# Patient Record
Sex: FEMALE | Race: ASIAN | NOT HISPANIC OR LATINO | Employment: FULL TIME | URBAN - METROPOLITAN AREA
[De-identification: names, ages, dates, MRNs, and addresses within clinical notes are randomized per-mention and may not be internally consistent; named-entity substitution may affect disease eponyms.]

---

## 2021-05-30 ENCOUNTER — RECORDS - HEALTHEAST (OUTPATIENT)
Dept: ADMINISTRATIVE | Facility: CLINIC | Age: 41
End: 2021-05-30

## 2021-10-12 ENCOUNTER — APPOINTMENT (OUTPATIENT)
Dept: CT IMAGING | Facility: CLINIC | Age: 41
DRG: 291 | End: 2021-10-12
Attending: EMERGENCY MEDICINE
Payer: COMMERCIAL

## 2021-10-12 ENCOUNTER — HOSPITAL ENCOUNTER (INPATIENT)
Facility: CLINIC | Age: 41
LOS: 1 days | Discharge: OTHER INSTITUTION | DRG: 291 | End: 2021-10-13
Attending: EMERGENCY MEDICINE | Admitting: INTERNAL MEDICINE
Payer: COMMERCIAL

## 2021-10-12 DIAGNOSIS — J81.0 ACUTE PULMONARY EDEMA (H): ICD-10-CM

## 2021-10-12 DIAGNOSIS — I16.0 HYPERTENSIVE URGENCY: ICD-10-CM

## 2021-10-12 DIAGNOSIS — J90 PLEURAL EFFUSION, BILATERAL: ICD-10-CM

## 2021-10-12 DIAGNOSIS — R79.89 ELEVATED LIVER FUNCTION TESTS: ICD-10-CM

## 2021-10-12 DIAGNOSIS — I31.39 PERICARDIAL EFFUSION: ICD-10-CM

## 2021-10-12 LAB
BNP SERPL-MCNC: 1421 PG/ML (ref 0–64)
C REACTIVE PROTEIN LHE: 1.3 MG/DL (ref 0–0.8)
INR PPP: 1.29 (ref 0.85–1.15)
TROPONIN I SERPL-MCNC: 0.04 NG/ML (ref 0–0.29)
TSH SERPL DL<=0.005 MIU/L-ACNC: 1.21 UIU/ML (ref 0.3–5)

## 2021-10-12 PROCEDURE — 86141 C-REACTIVE PROTEIN HS: CPT | Performed by: EMERGENCY MEDICINE

## 2021-10-12 PROCEDURE — 85610 PROTHROMBIN TIME: CPT | Performed by: EMERGENCY MEDICINE

## 2021-10-12 PROCEDURE — 36415 COLL VENOUS BLD VENIPUNCTURE: CPT | Performed by: EMERGENCY MEDICINE

## 2021-10-12 PROCEDURE — 84484 ASSAY OF TROPONIN QUANT: CPT | Performed by: EMERGENCY MEDICINE

## 2021-10-12 PROCEDURE — 93005 ELECTROCARDIOGRAM TRACING: CPT | Performed by: EMERGENCY MEDICINE

## 2021-10-12 PROCEDURE — 99285 EMERGENCY DEPT VISIT HI MDM: CPT | Mod: 25

## 2021-10-12 PROCEDURE — 74176 CT ABD & PELVIS W/O CONTRAST: CPT

## 2021-10-12 PROCEDURE — 83880 ASSAY OF NATRIURETIC PEPTIDE: CPT | Performed by: EMERGENCY MEDICINE

## 2021-10-12 PROCEDURE — 250N000013 HC RX MED GY IP 250 OP 250 PS 637: Performed by: EMERGENCY MEDICINE

## 2021-10-12 PROCEDURE — 84443 ASSAY THYROID STIM HORMONE: CPT | Performed by: EMERGENCY MEDICINE

## 2021-10-12 RX ORDER — NITROGLYCERIN 20 MG/100ML
10-200 INJECTION INTRAVENOUS CONTINUOUS
Status: DISCONTINUED | OUTPATIENT
Start: 2021-10-13 | End: 2021-10-13

## 2021-10-12 RX ORDER — NITROGLYCERIN 0.4 MG/1
0.4 TABLET SUBLINGUAL EVERY 5 MIN PRN
Status: DISCONTINUED | OUTPATIENT
Start: 2021-10-12 | End: 2021-10-12

## 2021-10-12 RX ADMIN — NITROGLYCERIN 15 MG: 20 OINTMENT TOPICAL at 23:04

## 2021-10-12 ASSESSMENT — MIFFLIN-ST. JEOR: SCORE: 1093.2

## 2021-10-13 ENCOUNTER — HOSPITAL ENCOUNTER (INPATIENT)
Facility: HOSPITAL | Age: 41
LOS: 3 days | Discharge: HOME | DRG: 291 | End: 2021-10-16
Attending: INTERNAL MEDICINE | Admitting: INTERNAL MEDICINE
Payer: COMMERCIAL

## 2021-10-13 ENCOUNTER — APPOINTMENT (OUTPATIENT)
Dept: CARDIOLOGY | Facility: CLINIC | Age: 41
DRG: 291 | End: 2021-10-13
Attending: INTERNAL MEDICINE
Payer: COMMERCIAL

## 2021-10-13 VITALS
RESPIRATION RATE: 20 BRPM | SYSTOLIC BLOOD PRESSURE: 136 MMHG | HEART RATE: 70 BPM | OXYGEN SATURATION: 93 % | BODY MASS INDEX: 28.06 KG/M2 | WEIGHT: 130.07 LBS | DIASTOLIC BLOOD PRESSURE: 82 MMHG | HEIGHT: 57 IN | TEMPERATURE: 97.8 F

## 2021-10-13 DIAGNOSIS — F90.9 ATTENTION DEFICIT HYPERACTIVITY DISORDER (ADHD), UNSPECIFIED ADHD TYPE: ICD-10-CM

## 2021-10-13 DIAGNOSIS — J90 PLEURAL EFFUSION, BILATERAL: ICD-10-CM

## 2021-10-13 DIAGNOSIS — I31.39 PERICARDIAL EFFUSION: ICD-10-CM

## 2021-10-13 DIAGNOSIS — I50.21 ACUTE SYSTOLIC CONGESTIVE HEART FAILURE (H): Primary | ICD-10-CM

## 2021-10-13 PROBLEM — J81.0 ACUTE PULMONARY EDEMA (H): Status: ACTIVE | Noted: 2021-10-13

## 2021-10-13 PROBLEM — I16.0 HYPERTENSIVE URGENCY: Status: ACTIVE | Noted: 2021-10-13

## 2021-10-13 PROBLEM — F10.10 ALCOHOL CONSUMPTION BINGE DRINKING: Chronic | Status: ACTIVE | Noted: 2021-10-13

## 2021-10-13 PROBLEM — R79.89 ELEVATED LIVER FUNCTION TESTS: Status: ACTIVE | Noted: 2021-10-13

## 2021-10-13 PROBLEM — Z78.9 ELECTRONIC CIGARETTE USE: Chronic | Status: ACTIVE | Noted: 2021-10-13

## 2021-10-13 LAB
ATRIAL RATE - MUSE: 105 BPM
DIASTOLIC BLOOD PRESSURE - MUSE: 123 MMHG
FERRITIN SERPL-MCNC: 76 NG/ML (ref 10–130)
GLUCOSE BLDC GLUCOMTR-MCNC: 124 MG/DL (ref 70–99)
INTERPRETATION ECG - MUSE: NORMAL
LVEF ECHO: NORMAL
P AXIS - MUSE: 64 DEGREES
PR INTERVAL - MUSE: 182 MS
QRS DURATION - MUSE: 98 MS
QT - MUSE: 302 MS
QTC - MUSE: 399 MS
R AXIS - MUSE: 91 DEGREES
SYSTOLIC BLOOD PRESSURE - MUSE: 167 MMHG
T AXIS - MUSE: 62 DEGREES
VENTRICULAR RATE- MUSE: 105 BPM

## 2021-10-13 PROCEDURE — 250N000013 HC RX MED GY IP 250 OP 250 PS 637: Performed by: FAMILY MEDICINE

## 2021-10-13 PROCEDURE — 93306 TTE W/DOPPLER COMPLETE: CPT | Mod: 26 | Performed by: INTERNAL MEDICINE

## 2021-10-13 PROCEDURE — 99207 PR NON-BILLABLE SERV PER CHARTING: CPT | Performed by: INTERNAL MEDICINE

## 2021-10-13 PROCEDURE — 255N000002 HC RX 255 OP 636: Performed by: INTERNAL MEDICINE

## 2021-10-13 PROCEDURE — 210N000001 HC R&B IMCU HEART CARE

## 2021-10-13 PROCEDURE — 250N000013 HC RX MED GY IP 250 OP 250 PS 637: Performed by: INTERNAL MEDICINE

## 2021-10-13 PROCEDURE — 99223 1ST HOSP IP/OBS HIGH 75: CPT | Performed by: INTERNAL MEDICINE

## 2021-10-13 PROCEDURE — 99207 PR NO BILLABLE SERVICE THIS VISIT: CPT | Performed by: FAMILY MEDICINE

## 2021-10-13 PROCEDURE — 96365 THER/PROPH/DIAG IV INF INIT: CPT

## 2021-10-13 PROCEDURE — 96366 THER/PROPH/DIAG IV INF ADDON: CPT

## 2021-10-13 PROCEDURE — 250N000009 HC RX 250: Performed by: EMERGENCY MEDICINE

## 2021-10-13 PROCEDURE — 96375 TX/PRO/DX INJ NEW DRUG ADDON: CPT

## 2021-10-13 PROCEDURE — 82728 ASSAY OF FERRITIN: CPT | Performed by: STUDENT IN AN ORGANIZED HEALTH CARE EDUCATION/TRAINING PROGRAM

## 2021-10-13 PROCEDURE — 36415 COLL VENOUS BLD VENIPUNCTURE: CPT | Performed by: STUDENT IN AN ORGANIZED HEALTH CARE EDUCATION/TRAINING PROGRAM

## 2021-10-13 PROCEDURE — 250N000011 HC RX IP 250 OP 636: Performed by: INTERNAL MEDICINE

## 2021-10-13 PROCEDURE — 120N000001 HC R&B MED SURG/OB

## 2021-10-13 PROCEDURE — 250N000011 HC RX IP 250 OP 636: Performed by: EMERGENCY MEDICINE

## 2021-10-13 PROCEDURE — 250N000011 HC RX IP 250 OP 636: Performed by: STUDENT IN AN ORGANIZED HEALTH CARE EDUCATION/TRAINING PROGRAM

## 2021-10-13 RX ORDER — DEXTROAMPHETAMINE SACCHARATE, AMPHETAMINE ASPARTATE MONOHYDRATE, DEXTROAMPHETAMINE SULFATE AND AMPHETAMINE SULFATE 3.75; 3.75; 3.75; 3.75 MG/1; MG/1; MG/1; MG/1
30 CAPSULE, EXTENDED RELEASE ORAL DAILY
Status: DISCONTINUED | OUTPATIENT
Start: 2021-10-13 | End: 2021-10-14 | Stop reason: ALTCHOICE

## 2021-10-13 RX ORDER — LANOLIN ALCOHOL/MO/W.PET/CERES
3 CREAM (GRAM) TOPICAL
Status: DISCONTINUED | OUTPATIENT
Start: 2021-10-13 | End: 2021-10-13 | Stop reason: HOSPADM

## 2021-10-13 RX ORDER — DEXTROAMPHETAMINE SACCHARATE, AMPHETAMINE ASPARTATE MONOHYDRATE, DEXTROAMPHETAMINE SULFATE AND AMPHETAMINE SULFATE 1.25; 1.25; 1.25; 1.25 MG/1; MG/1; MG/1; MG/1
40 CAPSULE, EXTENDED RELEASE ORAL DAILY
Status: DISCONTINUED | OUTPATIENT
Start: 2021-10-14 | End: 2021-10-13

## 2021-10-13 RX ORDER — SPIRONOLACTONE 25 MG/1
25 TABLET ORAL DAILY
Status: CANCELLED | OUTPATIENT
Start: 2021-10-14

## 2021-10-13 RX ORDER — LANOLIN ALCOHOL/MO/W.PET/CERES
3 CREAM (GRAM) TOPICAL
Status: CANCELLED | OUTPATIENT
Start: 2021-10-13

## 2021-10-13 RX ORDER — CARVEDILOL 12.5 MG/1
12.5 TABLET ORAL 2 TIMES DAILY WITH MEALS
Status: DISCONTINUED | OUTPATIENT
Start: 2021-10-13 | End: 2021-10-13

## 2021-10-13 RX ORDER — ALBUTEROL SULFATE 90 UG/1
1-2 AEROSOL, METERED RESPIRATORY (INHALATION) EVERY 4 HOURS PRN
Status: DISCONTINUED | OUTPATIENT
Start: 2021-10-13 | End: 2021-10-16 | Stop reason: HOSPADM

## 2021-10-13 RX ORDER — DEXTROAMPHETAMINE SACCHARATE, AMPHETAMINE ASPARTATE MONOHYDRATE, DEXTROAMPHETAMINE SULFATE AND AMPHETAMINE SULFATE 1.25; 1.25; 1.25; 1.25 MG/1; MG/1; MG/1; MG/1
10 CAPSULE, EXTENDED RELEASE ORAL DAILY
Status: DISCONTINUED | OUTPATIENT
Start: 2021-10-13 | End: 2021-10-14 | Stop reason: ALTCHOICE

## 2021-10-13 RX ORDER — MONTELUKAST SODIUM 10 MG/1
10 TABLET ORAL DAILY
Status: CANCELLED | OUTPATIENT
Start: 2021-10-13

## 2021-10-13 RX ORDER — ACETAMINOPHEN 650 MG/1
650 SUPPOSITORY RECTAL EVERY 6 HOURS PRN
Status: DISCONTINUED | OUTPATIENT
Start: 2021-10-13 | End: 2021-10-13 | Stop reason: HOSPADM

## 2021-10-13 RX ORDER — IBUPROFEN 400 MG/1
400 TABLET, FILM COATED ORAL EVERY 6 HOURS PRN
Status: ON HOLD | COMMUNITY
End: 2021-10-16

## 2021-10-13 RX ORDER — LIDOCAINE 40 MG/G
CREAM TOPICAL
Status: CANCELLED | OUTPATIENT
Start: 2021-10-13

## 2021-10-13 RX ORDER — ALBUTEROL SULFATE 90 UG/1
1-2 AEROSOL, METERED RESPIRATORY (INHALATION) EVERY 4 HOURS PRN
COMMUNITY
Start: 2021-08-02

## 2021-10-13 RX ORDER — DEXTROAMPHETAMINE SACCHARATE, AMPHETAMINE ASPARTATE MONOHYDRATE, DEXTROAMPHETAMINE SULFATE AND AMPHETAMINE SULFATE 1.25; 1.25; 1.25; 1.25 MG/1; MG/1; MG/1; MG/1
40 CAPSULE, EXTENDED RELEASE ORAL DAILY
Status: CANCELLED | OUTPATIENT
Start: 2021-10-14

## 2021-10-13 RX ORDER — ALBUTEROL SULFATE 90 UG/1
1-2 AEROSOL, METERED RESPIRATORY (INHALATION) EVERY 4 HOURS PRN
Status: CANCELLED | OUTPATIENT
Start: 2021-10-13

## 2021-10-13 RX ORDER — LIDOCAINE 40 MG/G
CREAM TOPICAL
Status: DISCONTINUED | OUTPATIENT
Start: 2021-10-13 | End: 2021-10-16 | Stop reason: HOSPADM

## 2021-10-13 RX ORDER — TRIAMCINOLONE ACETONIDE 1 MG/G
1 CREAM TOPICAL 2 TIMES DAILY PRN
COMMUNITY
Start: 2021-06-21

## 2021-10-13 RX ORDER — ACETAMINOPHEN 325 MG/1
650 TABLET ORAL EVERY 6 HOURS PRN
Status: DISCONTINUED | OUTPATIENT
Start: 2021-10-13 | End: 2021-10-13 | Stop reason: HOSPADM

## 2021-10-13 RX ORDER — ONDANSETRON 2 MG/ML
4 INJECTION INTRAMUSCULAR; INTRAVENOUS EVERY 6 HOURS PRN
Status: CANCELLED | OUTPATIENT
Start: 2021-10-13

## 2021-10-13 RX ORDER — METOPROLOL TARTRATE 1 MG/ML
2.5 INJECTION, SOLUTION INTRAVENOUS ONCE
Status: COMPLETED | OUTPATIENT
Start: 2021-10-13 | End: 2021-10-13

## 2021-10-13 RX ORDER — FLUTICASONE PROPIONATE 220 UG/1
2 AEROSOL, METERED RESPIRATORY (INHALATION) 2 TIMES DAILY
Status: CANCELLED | OUTPATIENT
Start: 2021-10-13

## 2021-10-13 RX ORDER — ACETAMINOPHEN 650 MG/1
650 SUPPOSITORY RECTAL EVERY 6 HOURS PRN
Status: CANCELLED | OUTPATIENT
Start: 2021-10-13

## 2021-10-13 RX ORDER — METOPROLOL TARTRATE 25 MG/1
50 TABLET, FILM COATED ORAL ONCE
Status: COMPLETED | OUTPATIENT
Start: 2021-10-13 | End: 2021-10-13

## 2021-10-13 RX ORDER — ONDANSETRON 4 MG/1
4 TABLET, ORALLY DISINTEGRATING ORAL EVERY 6 HOURS PRN
Status: DISCONTINUED | OUTPATIENT
Start: 2021-10-13 | End: 2021-10-13 | Stop reason: HOSPADM

## 2021-10-13 RX ORDER — MONTELUKAST SODIUM 10 MG/1
10 TABLET ORAL DAILY
Status: DISCONTINUED | OUTPATIENT
Start: 2021-10-13 | End: 2021-10-13 | Stop reason: HOSPADM

## 2021-10-13 RX ORDER — LISINOPRIL 5 MG/1
10 TABLET ORAL DAILY
Status: DISCONTINUED | OUTPATIENT
Start: 2021-10-14 | End: 2021-10-16 | Stop reason: HOSPADM

## 2021-10-13 RX ORDER — DEXTROAMPHETAMINE SACCHARATE, AMPHETAMINE ASPARTATE MONOHYDRATE, DEXTROAMPHETAMINE SULFATE AND AMPHETAMINE SULFATE 1.25; 1.25; 1.25; 1.25 MG/1; MG/1; MG/1; MG/1
40 CAPSULE, EXTENDED RELEASE ORAL DAILY
Status: DISCONTINUED | OUTPATIENT
Start: 2021-10-13 | End: 2021-10-13 | Stop reason: HOSPADM

## 2021-10-13 RX ORDER — ACETAMINOPHEN 650 MG/1
650 SUPPOSITORY RECTAL EVERY 6 HOURS PRN
Status: DISCONTINUED | OUTPATIENT
Start: 2021-10-13 | End: 2021-10-16 | Stop reason: HOSPADM

## 2021-10-13 RX ORDER — MONTELUKAST SODIUM 10 MG/1
10 TABLET ORAL DAILY
COMMUNITY
Start: 2021-10-11

## 2021-10-13 RX ORDER — FUROSEMIDE 40 MG
40 TABLET ORAL DAILY
Status: DISCONTINUED | OUTPATIENT
Start: 2021-10-13 | End: 2021-10-13

## 2021-10-13 RX ORDER — FUROSEMIDE 10 MG/ML
20 INJECTION INTRAMUSCULAR; INTRAVENOUS EVERY 12 HOURS
Status: DISCONTINUED | OUTPATIENT
Start: 2021-10-13 | End: 2021-10-13 | Stop reason: HOSPADM

## 2021-10-13 RX ORDER — DEXTROAMPHETAMINE SACCHARATE, AMPHETAMINE ASPARTATE MONOHYDRATE, DEXTROAMPHETAMINE SULFATE AND AMPHETAMINE SULFATE 5; 5; 5; 5 MG/1; MG/1; MG/1; MG/1
40 CAPSULE, EXTENDED RELEASE ORAL DAILY
Status: ON HOLD | COMMUNITY
Start: 2021-09-09 | End: 2021-10-16

## 2021-10-13 RX ORDER — FUROSEMIDE 10 MG/ML
20 INJECTION INTRAMUSCULAR; INTRAVENOUS EVERY 12 HOURS
Status: DISCONTINUED | OUTPATIENT
Start: 2021-10-14 | End: 2021-10-13

## 2021-10-13 RX ORDER — NORTRIPTYLINE HYDROCHLORIDE 50 MG/1
60 CAPSULE ORAL DAILY
COMMUNITY
Start: 2021-10-09

## 2021-10-13 RX ORDER — SPIRONOLACTONE 25 MG/1
25 TABLET ORAL DAILY
Status: DISCONTINUED | OUTPATIENT
Start: 2021-10-13 | End: 2021-10-13 | Stop reason: HOSPADM

## 2021-10-13 RX ORDER — ONDANSETRON 2 MG/ML
4 INJECTION INTRAMUSCULAR; INTRAVENOUS EVERY 6 HOURS PRN
Status: DISCONTINUED | OUTPATIENT
Start: 2021-10-13 | End: 2021-10-13 | Stop reason: HOSPADM

## 2021-10-13 RX ORDER — FUROSEMIDE 10 MG/ML
40 INJECTION INTRAMUSCULAR; INTRAVENOUS ONCE
Status: COMPLETED | OUTPATIENT
Start: 2021-10-13 | End: 2021-10-13

## 2021-10-13 RX ORDER — ALBUTEROL SULFATE 90 UG/1
1-2 AEROSOL, METERED RESPIRATORY (INHALATION) EVERY 4 HOURS PRN
Status: DISCONTINUED | OUTPATIENT
Start: 2021-10-13 | End: 2021-10-13 | Stop reason: HOSPADM

## 2021-10-13 RX ORDER — LIDOCAINE 40 MG/G
CREAM TOPICAL
Status: DISCONTINUED | OUTPATIENT
Start: 2021-10-13 | End: 2021-10-13 | Stop reason: HOSPADM

## 2021-10-13 RX ORDER — FUROSEMIDE 10 MG/ML
20 INJECTION INTRAMUSCULAR; INTRAVENOUS EVERY 12 HOURS
Status: CANCELLED | OUTPATIENT
Start: 2021-10-14

## 2021-10-13 RX ORDER — FAMOTIDINE 20 MG/1
20 TABLET, FILM COATED ORAL 2 TIMES DAILY
Status: DISCONTINUED | OUTPATIENT
Start: 2021-10-13 | End: 2021-10-16 | Stop reason: HOSPADM

## 2021-10-13 RX ORDER — FUROSEMIDE 10 MG/ML
40 INJECTION INTRAMUSCULAR; INTRAVENOUS EVERY 8 HOURS
Status: DISCONTINUED | OUTPATIENT
Start: 2021-10-13 | End: 2021-10-14

## 2021-10-13 RX ORDER — ACETAMINOPHEN 325 MG/1
650 TABLET ORAL EVERY 6 HOURS PRN
Status: CANCELLED | OUTPATIENT
Start: 2021-10-13

## 2021-10-13 RX ORDER — ONDANSETRON 2 MG/ML
4 INJECTION INTRAMUSCULAR; INTRAVENOUS EVERY 6 HOURS PRN
Status: DISCONTINUED | OUTPATIENT
Start: 2021-10-13 | End: 2021-10-16 | Stop reason: HOSPADM

## 2021-10-13 RX ORDER — MONTELUKAST SODIUM 10 MG/1
10 TABLET ORAL DAILY
Status: DISCONTINUED | OUTPATIENT
Start: 2021-10-13 | End: 2021-10-16 | Stop reason: HOSPADM

## 2021-10-13 RX ORDER — FAMOTIDINE 20 MG/1
20 TABLET, FILM COATED ORAL 2 TIMES DAILY
Status: CANCELLED | OUTPATIENT
Start: 2021-10-13

## 2021-10-13 RX ORDER — ACETAMINOPHEN 325 MG/1
650 TABLET ORAL EVERY 6 HOURS PRN
Status: DISCONTINUED | OUTPATIENT
Start: 2021-10-13 | End: 2021-10-16 | Stop reason: HOSPADM

## 2021-10-13 RX ORDER — FLUTICASONE PROPIONATE 220 UG/1
2 AEROSOL, METERED RESPIRATORY (INHALATION) 2 TIMES DAILY
COMMUNITY
Start: 2021-10-12

## 2021-10-13 RX ORDER — FAMOTIDINE 20 MG/1
20 TABLET, FILM COATED ORAL 2 TIMES DAILY
Status: DISCONTINUED | OUTPATIENT
Start: 2021-10-13 | End: 2021-10-13 | Stop reason: HOSPADM

## 2021-10-13 RX ORDER — FLUTICASONE PROPIONATE 220 UG/1
2 AEROSOL, METERED RESPIRATORY (INHALATION) 2 TIMES DAILY
Status: DISCONTINUED | OUTPATIENT
Start: 2021-10-13 | End: 2021-10-13 | Stop reason: HOSPADM

## 2021-10-13 RX ORDER — LISINOPRIL 10 MG/1
10 TABLET ORAL DAILY
Status: DISCONTINUED | OUTPATIENT
Start: 2021-10-13 | End: 2021-10-13 | Stop reason: HOSPADM

## 2021-10-13 RX ORDER — ONDANSETRON 4 MG/1
4 TABLET, ORALLY DISINTEGRATING ORAL EVERY 6 HOURS PRN
Status: DISCONTINUED | OUTPATIENT
Start: 2021-10-13 | End: 2021-10-16 | Stop reason: HOSPADM

## 2021-10-13 RX ORDER — FLUTICASONE PROPIONATE 220 UG/1
2 AEROSOL, METERED RESPIRATORY (INHALATION) 2 TIMES DAILY
Status: DISCONTINUED | OUTPATIENT
Start: 2021-10-13 | End: 2021-10-16 | Stop reason: HOSPADM

## 2021-10-13 RX ORDER — LANOLIN ALCOHOL/MO/W.PET/CERES
3 CREAM (GRAM) TOPICAL
Status: DISCONTINUED | OUTPATIENT
Start: 2021-10-13 | End: 2021-10-15

## 2021-10-13 RX ORDER — LISINOPRIL 10 MG/1
10 TABLET ORAL DAILY
Status: CANCELLED | OUTPATIENT
Start: 2021-10-14

## 2021-10-13 RX ORDER — SPIRONOLACTONE 25 MG/1
25 TABLET ORAL DAILY
Status: DISCONTINUED | OUTPATIENT
Start: 2021-10-14 | End: 2021-10-16 | Stop reason: HOSPADM

## 2021-10-13 RX ORDER — ONDANSETRON 4 MG/1
4 TABLET, ORALLY DISINTEGRATING ORAL EVERY 6 HOURS PRN
Status: CANCELLED | OUTPATIENT
Start: 2021-10-13

## 2021-10-13 RX ADMIN — FUROSEMIDE 40 MG: 40 TABLET ORAL at 08:31

## 2021-10-13 RX ADMIN — METOPROLOL TARTRATE 50 MG: 25 TABLET, FILM COATED ORAL at 02:19

## 2021-10-13 RX ADMIN — FUROSEMIDE 20 MG: 10 INJECTION, SOLUTION INTRAMUSCULAR; INTRAVENOUS at 12:21

## 2021-10-13 RX ADMIN — SERTRALINE HYDROCHLORIDE 50 MG: 50 TABLET, FILM COATED ORAL at 11:04

## 2021-10-13 RX ADMIN — FUROSEMIDE 40 MG: 10 INJECTION, SOLUTION INTRAMUSCULAR; INTRAVENOUS at 22:48

## 2021-10-13 RX ADMIN — PERFLUTREN 2.5 ML: 6.52 INJECTION, SUSPENSION INTRAVENOUS at 09:38

## 2021-10-13 RX ADMIN — FUROSEMIDE 40 MG: 10 INJECTION, SOLUTION INTRAMUSCULAR; INTRAVENOUS at 00:31

## 2021-10-13 RX ADMIN — MELATONIN TAB 3 MG 3 MG: 3 TAB at 22:49

## 2021-10-13 RX ADMIN — FAMOTIDINE 20 MG: 20 TABLET, FILM COATED ORAL at 22:48

## 2021-10-13 RX ADMIN — NORTRIPTYLINE HYDROCHLORIDE 60 MG: 50 CAPSULE ORAL at 22:48

## 2021-10-13 RX ADMIN — Medication 3 MG: at 02:19

## 2021-10-13 RX ADMIN — FAMOTIDINE 20 MG: 20 TABLET ORAL at 08:26

## 2021-10-13 RX ADMIN — LISINOPRIL 10 MG: 10 TABLET ORAL at 08:30

## 2021-10-13 RX ADMIN — SPIRONOLACTONE 25 MG: 25 TABLET ORAL at 08:30

## 2021-10-13 RX ADMIN — NITROGLYCERIN 10 MCG/MIN: 20 INJECTION INTRAVENOUS at 00:06

## 2021-10-13 RX ADMIN — CARVEDILOL 12.5 MG: 12.5 TABLET, FILM COATED ORAL at 08:29

## 2021-10-13 RX ADMIN — METOPROLOL TARTRATE 2.5 MG: 1 INJECTION, SOLUTION INTRAVENOUS at 00:32

## 2021-10-13 RX ADMIN — MONTELUKAST 10 MG: 10 TABLET, FILM COATED ORAL at 22:49

## 2021-10-13 RX ADMIN — FUROSEMIDE 40 MG: 10 INJECTION, SOLUTION INTRAMUSCULAR; INTRAVENOUS at 16:38

## 2021-10-13 ASSESSMENT — MIFFLIN-ST. JEOR: SCORE: 1138.88

## 2021-10-13 ASSESSMENT — ACTIVITIES OF DAILY LIVING (ADL): DEPENDENT_IADLS:: INDEPENDENT

## 2021-10-13 NOTE — PROGRESS NOTES
Patient admitted earlier this morning by my colleague.  Diagnosis is CHF.  Patient currently diuresing well.  Echocardiogram pending.  I went through med reconciliation and ordered her medications.  All questions from the nursing staff answered. No charge.

## 2021-10-13 NOTE — PROGRESS NOTES
[facility]    Medicine Progress Note - Hospitalist Service    Date of Admission:  10/13/2021    Assessment & Plan   SUMMARY:  41 year old female with history of hypertension, attention deficit disorder since childhood on chronic Adderall therapy, e-cigarette use, binge alcohol consumption, history of preeclampsia, first presented to urgency room with complaint of acute on chronic dyspnea and progressive fatigue.  D-dimer was found to be elevated and work-up included pulmonary CTA which was negative for pulmonary embolism but showed pulmonary edema.  She was then referred to [facility] ED where she has been boarding until her transfer to [facility].    Patient recalls shortness of breath starting approximately 11 months ago last November. When persisted, she visited her primary care doctor who considered this might be reactive airway disease and prescribed an inhaler.    Patient reports for the last 2-3 weeks she has had increasing lower extremity swelling, progressive fatigue and breathlessness.  She is fully vaccinated against COVID-19  She lives at home with her father and son who is 15 years old     ACTIVE PROBLEM LIST  Acute systolic congestive heart failure:  Severe cardiomyopathy:  Echocardiogram shows EF 10 to 15% with severe global hypokinesia  CT pulmonary angiogram negative for PE  Cardiology following and recommending full cardiac work-up including cardiac MRI and possibly angiogram  Diuresing on Lasix and started Aldactone and ACE inhibitor  Tobacco use: Patient does use e-cigarettes on a regular basis she is counseled not to pursue this anymore  Hypertension: Longstanding and untreated.  As mentioned below dates back to her pregnancy 15 years ago.  Elevated liver enzymes:  Most likely related to passive liver congestion.  Will check ultrasound liver  Macrocytosis:  Check B12 levels    Overweight w BMI 25-29.9: Body mass index is 28.52 kg/m .     DVT prophylaxis:    Medical:   early ambulation    Mechanical:  PCD's    Disposition Plan: Expected discharge: 10/16/2021   recommended to Home once Severe cardiomyopathy and congestive heart failure worked up and treated.    Diet: Orders Placed This Encounter      2 Gram Sodium Diet      NPO for Medical/Clinical Reasons Except for: Meds    Frye Catheter: Not present  Central Lines/Port-a-cath: Not present  Drains: Not present     --------------------------------------------    The patient's care was discussed with the Patient.    Active Problems:    Pericardial effusion    Acute pulmonary edema (H)    Elevated liver function tests    Pleural effusion, bilateral    Hypertensive urgency      Lane Rod MD  Hospitalist Service  [facility]  Securely message with the Vocera Web Console (learn more here)  Text page via [employer] Paging/Directory    Clinically Significant Risk Factors Present on Admission             # Coagulation Defect: INR = 1.29 (Ref range: 0.85 - 1.15) and/or PTT = N/A on admission, will monitor for bleeding    ______________________________________________________________________    Interval History   Patient feeling better since diuresis was started, less short of breath and feels that her legs are starting to decrease in diameter    ROS: Denies chest pain, denies shortness of breath, Moving bowels well, passing urine well, slept well and good appetite    Data personally reviewed from the last 24 hours:   Radiology: General X-ray images, CT reports and Echocardiogram report  Reviewed telemetry, Laboratory results, Consultant recommendations and medications.    Physical Exam   /72 (BP Location: Left arm)   Pulse 66   Temp 98  F (36.7  C) (Oral)   Resp 18   Wt 59.8 kg (131 lb 12.8 oz)   LMP 09/28/2021   SpO2 96%   BMI 28.52 kg/m      Physical Exam    General Appearance:  HEENT:  Awake, Alert, Cooperative, in no distress and appears stated age   Normocephalic, atraumatic, conjunctiva clear  without icterus and ears without discharge   Lungs:   Clear to auscultation bilaterally, no wheezing, good air exchange, normal work of breathing   Cardiovascular:  Regular Rate and Rythm, normal apical impulse, normal S1 and S2, 1+ lower extremity edema bilaterally   Abdomen: Soft, non-tender and Non-distended, active bowel sounds   Skin:  Skin color, texture normal and bruising or bleeding. No rashes or lesions over face, neck, arms and legs, turgor normal.   Neurologic:    Neuropsychiatric: Alert & Oriented X 3, Facial symmetry preserved and upper & lower extremities moving well with symmetry  Calm, normal eye contact, Affect normal     Data   Recent Labs   Lab 10/13/21  2047 10/12/21  2248   INR  --  1.29*   *  --      Recent Results (from the past 24 hour(s))   CT Abdomen Pelvis w/o Contrast    Narrative    EXAM: CT ABDOMEN PELVIS W/O CONTRAST  LOCATION: [facility]  DATE/TIME: 10/12/2021 11:24 PM    INDICATION: Abdominal distension  COMPARISON: None.  TECHNIQUE: CT scan of the abdomen and pelvis was performed without IV contrast. Multiplanar reformats were obtained. Dose reduction techniques were used.  CONTRAST: None.    FINDINGS:   LOWER CHEST: Moderate right and small left pleural effusions. Small pericardial effusion. Cardiomegaly.    HEPATOBILIARY: Normal.    PANCREAS: Normal.    SPLEEN: Normal.    ADRENAL GLANDS: Normal.    KIDNEYS/BLADDER: Normal.    BOWEL: Mild ascites. No free air. No bowel obstruction.    LYMPH NODES: Normal.    VASCULATURE: Unremarkable.    PELVIC ORGANS: Normal.    MUSCULOSKELETAL: Mild diffuse subcutaneous edema.      Impression    IMPRESSION:   1.  Mild ascites and subcutaneous edema.  2.  Lateral right and small left pleural effusions.  3.  Small pericardial effusion.     Echocardiogram Complete   Result Value    LVEF  10-15% (severely reduced)    Narrative    [id number]  [id number]  [id number]  [id number]^KELLY                                                                        Version 2     [place] [address]     Name: DANIELLA MAGAÑA  MRN: [id number]  : 1980  Study Date: 10/13/2021 08:48 AM  Age: 41 yrs  Gender: Female  Patient Location: [facility]  Reason For Study: Heart Failure, Unspecified  Ordering Physician: KELLY TEJEDA  Performed By: SHERRY     BSA: 1.4 m2  Height: 57 in  Weight: 120 lb  BP: 132/90 mmHg  ______________________________________________________________________________  Procedure  Complete Portable Echo Adult. Definity (NDC #92700-779) given intravenously.  ______________________________________________________________________________  Interpretation Summary     1. The left ventricle is normal in size. Left ventricular function is  decreased. The ejection fraction is 10-15% (severely reduced).There is severe  global hypokinesia of the left ventricle.  2. The right ventricle is normal size. Moderately decreased right ventricular  systolic function  3. The left atrium is mildly dilated. The right atrium is mildly dilated.  4. There is mild (1+) mitral regurgitation.  5. There is moderate (2+) tricuspid regurgitation. RA pressure of 8 mmHg. The  right ventricular systolic pressure is approximated at 25mmHg plus the right  atrial pressure.  6. Small pericardial effusion. There are no echocardiographic indications of  cardiac tamponade.     Results discussed with Dr. Bhatt.     ______________________________________________________________________________  Left Ventricle  The left ventricle is normal in size. Left ventricular function is decreased.  The ejection fraction is 10-15% (severely reduced). There is normal left  ventricular wall thickness. Left ventricular diastolic function is abnormal.  There is severe global hypokinesia of the left ventricle.     Right Ventricle  The right ventricle is normal size. TAPSE is abnormal, which is consistent  with abnormal right ventricular systolic  function. Moderately decreased right  ventricular systolic function.     Atria  The left atrium is mildly dilated. The right atrium is mildly dilated. There  is no color Doppler evidence of an atrial shunt.     Mitral Valve  Mitral valve leaflets appear normal. There is no evidence of mitral stenosis  or clinically significant mitral regurgitation. There is mild (1+) mitral  regurgitation.     Tricuspid Valve  There is moderate (2+) tricuspid regurgitation. IVC diameter and respiratory  changes fall into an intermediate range suggesting an RA pressure of 8 mmHg.  The right ventricular systolic pressure is approximated at 25mmHg plus the  right atrial pressure. There is no tricuspid stenosis.     Aortic Valve  Aortic valve leaflets appear normal. There is no evidence of aortic stenosis  or clinically significant aortic regurgitation.     Pulmonic Valve  The pulmonic valve is not well seen, but is grossly normal. This degree of  valvular regurgitation is within normal limits.     Vessels  The aorta root is normal. Normal size ascending aorta. IVC diameter and  respiratory changes fall into an intermediate range suggesting an RA pressure  of 8 mmHg.     Pericardium  Small pericardial effusion. There are no echocardiographic indications of  cardiac tamponade.     ______________________________________________________________________________  MMode/2D Measurements & Calculations  IVSd: 1.1 cm  LVIDd: 4.3 cm  LVPWd: 1.1 cm     LV mass(C)d: 161.8 grams  LV mass(C)dI: 111.8 grams/m2  Ao root diam: 1.8 cm  LA dimension: 3.8 cm  asc Aorta Diam: 2.5 cm  LA/Ao: 2.1  LVOT diam: 1.7 cm  LVOT area: 2.4 cm2  LA Volume Indexed (AL/bp): 37.9 ml/m2  RWT: 0.52     Time Measurements  MM HR: 76.0 BPM     Doppler Measurements & Calculations  MV E max brianna: 65.0 cm/sec  MV A max brianna: 72.3 cm/sec  MV E/A: 0.90  MV dec time: 0.12 sec  LV V1 max P.60 mmHg  LV V1 max: 38.8 cm/sec  LV V1 VTI: 5.2 cm  SV(LVOT): 12.3 ml  SI(LVOT): 8.5  ml/m2  PA acc time: 0.08 sec  TR max brianna: 249.8 cm/sec  TR max P.0 mmHg  E/E' av.1  Lateral E/e': 9.6  Medial E/e': 14.6     ______________________________________________________________________________  Report approved by: Jazmín Patel 10/13/2021 12:20 PM

## 2021-10-13 NOTE — CONSULTS
Impression and Plan     Assessment:  1. Dyspnea on exertion due to #2  2. Acute congestive heart failure with reduced ejection fraction - Echo 10/13 EF 10-15% with severe global hypokinesia with normal LV size, moderately dilated RV  3. Hypertension  4. ADHD on Adderall   5. Tobacco use - e-cigarettes   6. Transaminitis - may be suggestive of underlying liver disease (ie hemachromatosis) or passive congestive hepatopathy from #2.    Plan:    Transfer to [facility] or facility with advanced heart failure workup capabilities - needs cardiac MRI    Lasix 20mg IV BID     Discontinue Coreg 12.5mg BID due to severely diminished EF. Will add back when better compensated from HF standpoint.    Continue Lisinopril 10mg every day and Spirnolactone 25mg every day     Ferritin level    Discussed with Dr. Orourke  Discussed with Dr. Sadaf Rhodes (HF specialist at [facility])    Primary Cardiologist: not previously established    History of Present Illness      Ms. Tereza Suarez is a 41 year old female with history of ADHD, recent asthma diagnosis, and mood disorders admitted for new onset acute congestive heart failure.    Presented to ER after dyspnea at rest and exertion x8 months initially thought to be asthma and lower extremity swelling x2-3 weeks. New onset of dyspnea, but has had lower extremity swelling in setting of last pregnancy in 2006 when was diagnosed with pre-eclampsia. No history of hypertension diagnosis, but was told had elevated blood pressures in the past.     Reports some chest tightness at rest, described as lungs being constricted and not being able to take a deep breath, denies dyspnea at rest currently however. Reported worsening exertional dyspnea past 3 weeks, having to catch her breath walking just few steps. Recent cold and/or viral URI approximately 4-6 weeks ago.     No history of joint pain, inflammatory joint disease, autoimmune diseases. No known family history due to being  adopted.     Other than noted above, Ms. Suarez denies any chest pain/pressure, light headedness/dizziness, pre-syncope, syncope, palpitations, paroxysmal nocturnal dyspnea (PND), or orthopnea.    Workup thus far with mildly elevated troponin of 0.03 and 0.04, BNP 1421, CRP 1.3, elevated D-dimer of 3.69 with CTA Chest for pulmonary emboli. Transaminatis with  , Bilirubin 1.4, INR 1.29, creatinine wnl. CBC unremarkable. TSH wnl.     Review of Systems:  Further review of systems is otherwise negative/noncontributory (based on review of medical record (admission H&P) and 13 point review of systems reviewed. Pertinent positives noted).    Cardiac Diagnostics     ECG: Personally reviewed and interpreted: sinus tachycardia with right axis deviation    Telemetry (personally reviewed): N/A    Most recent:  Echocardiogram (results reviewed):   10/13/21  Interpretation Summary     1. The left ventricle is normal in size. Left ventricular function is  decreased. The ejection fraction is 10-15% (severely reduced).There is severe  global hypokinesia of the left ventricle.  2. The right ventricle is normal size. The right ventricle is moderately  dilated.  3. The left atrium is mildly dilated. The right atrium is mildly dilated.  4. There is mild (1+) mitral regurgitation.  5. There is moderate (2+) tricuspid regurgitation. RA pressure of 8 mmHg. The  right ventricular systolic pressure is approximated at 25mmHg plus the right  atrial pressure.  6. Small pericardial effusion. There are no echocardiographic indications of  cardiac tamponade.  I personally reviewed echo images. RV function is moderately diminished with severe LV dysfunction and restrictive diastolic filling. I rate the TR as 3+ with hepatic vein systolic flow reversal          Medical History  Surgical History Family History Social History   Past Medical History:   Diagnosis Date     Alcohol consumption binge drinking 10/13/2021     Electronic  "cigarette use 10/13/2021     History reviewed. No pertinent surgical history.  History reviewed. No pertinent family history.        Social History     Socioeconomic History     Marital status: Single     Spouse name: Not on file     Number of children: Not on file     Years of education: Not on file     Highest education level: Not on file   Occupational History     Not on file   Tobacco Use     Smoking status: Never Smoker   Substance and Sexual Activity     Alcohol use: Not on file     Drug use: Not on file     Sexual activity: Not on file   Other Topics Concern     Not on file   Social History Narrative     Not on file     Social Determinants of Health     Financial Resource Strain:      Difficulty of Paying Living Expenses:    Food Insecurity:      Worried About Running Out of Food in the Last Year:      Ran Out of Food in the Last Year:    Transportation Needs:      Lack of Transportation (Medical):      Lack of Transportation (Non-Medical):    Physical Activity:      Days of Exercise per Week:      Minutes of Exercise per Session:    Stress:      Feeling of Stress :    Social Connections:      Frequency of Communication with Friends and Family:      Frequency of Social Gatherings with Friends and Family:      Attends Jain Services:      Active Member of Clubs or Organizations:      Attends Club or Organization Meetings:      Marital Status:    Intimate Partner Violence:      Fear of Current or Ex-Partner:      Emotionally Abused:      Physically Abused:      Sexually Abused:              Physical Examination   VITALS: BP (!) 134/94   Pulse 80   Temp 97.6  F (36.4  C) (Oral)   Resp 9   Ht 1.448 m (4' 9\")   Wt 59 kg (130 lb 1.1 oz)   LMP 09/28/2021   SpO2 97%   BMI 28.15 kg/m    BMI: Body mass index is 28.15 kg/m .  Wt Readings from Last 3 Encounters:   10/13/21 59 kg (130 lb 1.1 oz)       Intake/Output Summary (Last 24 hours) at 10/13/2021 0957  Last data filed at 10/13/2021 0825  Gross per 24 " hour   Intake 960 ml   Output 3400 ml   Net -2440 ml       General Appearance:  Alert, cooperative, no distress, appears stated age    Head:  Normocephalic, without obvious abnormality, atraumatic   Eyes:  PERRL, conjunctiva/corneas clear, EOM's intact   Ears:  Normal external ear canals bilaterally   Nose: Nares normal, septum midline, no drainage   Throat: Lips, mucosa, and tongue normal; teeth and gums normal   Neck: Supple, symmetrical, trachea midline, no adenopathy, no carotid bruit or JVD    Back:   Symmetric, no abnormal curvature, ROM normal   Lungs:   Clear to auscultation bilaterally, respirations unlabored   Chest Wall:  No tenderness or deformity   Heart:  Regular rate and rhythm, S1, S2 normal,no murmur, rub or gallop   Abdomen:   Bowel sounds active all four quadrants,  no masses, no organomegaly. Boggy, generalized tenderness.    Extremities: Extremities normal, atraumatic, no cyanosis. 2+ pitting edema to upper thighs.    Skin: Skin color, texture, turgor normal, no rashes or lesions   Psychiatric: Normal affect, pleasant and cooperative    Neurologic: Alert and oriented X 3, Moves all 4 extremities            Imaging      10/12/21 CT A/P without contrast:  IMPRESSION:   1.  Mild ascites and subcutaneous edema.  2.  Lateral right and small left pleural effusions.  3.  Small pericardial effusion.     Lab Results    Chemistry/lipid CBC Cardiac Enzymes/BNP/TSH/INR   No results for input(s): CHOL, HDL, LDL, TRIG, CHOLHDLRATIO in the last 63667 hours.  No results for input(s): LDL in the last 04430 hours.  No results for input(s): NA, POTASSIUM, CHLORIDE, CO2, GLC, BUN, CR, GFRESTIMATED, DELORES in the last 25508 hours.    Invalid input(s): GRFESTBLACK  No results for input(s): CR in the last 98201 hours.  No results for input(s): A1C in the last 90523 hours.       No results for input(s): WBC, HGB, HCT, MCV, PLT in the last 19300 hours.  No results for input(s): HGB in the last 32111 hours. Recent Labs    Lab Test 10/12/21  2248   TROPONINI 0.04     Recent Labs   Lab Test 10/12/21  2248   BNP 1,421*     Recent Labs   Lab Test 10/12/21  2248   TSH 1.21     Recent Labs   Lab Test 10/12/21  2248   INR 1.29*           Current Inpatient Scheduled Medications   Scheduled Meds:    carvedilol  12.5 mg Oral BID w/meals     famotidine  20 mg Oral BID     furosemide  40 mg Oral Daily     lisinopril  10 mg Oral Daily     sodium chloride (PF)  3 mL Intracatheter Q8H     spironolactone  25 mg Oral Daily     Continuous Infusions:    Current Outpatient Medications   Medication Sig Dispense Refill     albuterol (PROAIR HFA/PROVENTIL HFA/VENTOLIN HFA) 108 (90 Base) MCG/ACT inhaler Inhale 1-2 puffs into the lungs every 4 hours as needed for wheezing       amphetamine-dextroamphetamine (ADDERALL XR) 20 MG 24 hr capsule Take 40 mg by mouth daily       FLOVENT  MCG/ACT inhaler Inhale 2 puffs into the lungs 2 times daily       ibuprofen (ADVIL/MOTRIN) 400 MG tablet Take 400 mg by mouth every 6 hours as needed       montelukast (SINGULAIR) 10 MG tablet Take 10 mg by mouth daily       nortriptyline (PAMELOR) 50 MG capsule Take 60 mg by mouth daily        sertraline (ZOLOFT) 50 MG tablet Take 50 mg by mouth daily       triamcinolone (KENALOG) 0.1 % external cream Apply 1 Application topically 2 times daily as needed (excema)             Medications Prior to Admission   Prior to Admission medications    Medication Sig Start Date End Date Taking? Authorizing Provider   albuterol (PROAIR HFA/PROVENTIL HFA/VENTOLIN HFA) 108 (90 Base) MCG/ACT inhaler Inhale 1-2 puffs into the lungs every 4 hours as needed for wheezing 8/2/21  Yes Unknown, Entered By History   amphetamine-dextroamphetamine (ADDERALL XR) 20 MG 24 hr capsule Take 40 mg by mouth daily 9/9/21  Yes Unknown, Entered By History   FLOVENT  MCG/ACT inhaler Inhale 2 puffs into the lungs 2 times daily 10/12/21  Yes Unknown, Entered By History   ibuprofen (ADVIL/MOTRIN) 400 MG  tablet Take 400 mg by mouth every 6 hours as needed   Yes Unknown, Entered By History   montelukast (SINGULAIR) 10 MG tablet Take 10 mg by mouth daily 10/11/21  Yes Unknown, Entered By History   nortriptyline (PAMELOR) 50 MG capsule Take 60 mg by mouth daily  10/9/21  Yes Unknown, Entered By History   sertraline (ZOLOFT) 50 MG tablet Take 50 mg by mouth daily 10/9/21  Yes Unknown, Entered By History   triamcinolone (KENALOG) 0.1 % external cream Apply 1 Application topically 2 times daily as needed (excema)  6/21/21  Yes Unknown, Entered By History          Saroj Mccrary DO, PGY-2  [facility]    -------------------------------------------------------------------------------------------------------------------  The patient was interviewed and examined by me on 10/13/2021.   My exam confirms the findings described in the note of Dr. Saroj Mccrary.   The note has been reviewed and edited by me and it accurately portrays the assessment and plan we discussed.

## 2021-10-13 NOTE — DISCHARGE SUMMARY
Patient admitted several hours ago for edema and found to have ejection fraction of 15%.  Discussion with Dr. Bhatt, cardiology, indicated that the patient would benefit from transfer to [facility] for full cardiac work-up.  Discussed with Dr. Rod at [facility] who graciously accepted the patient for transfer.  Med rec complete.

## 2021-10-13 NOTE — ED NOTES
Bed: [facility]ED-09  Expected date: 10/12/21  Expected time: 10:25 PM  Means of arrival: Ambulance  Comments:

## 2021-10-13 NOTE — PHARMACY-ADMISSION MEDICATION HISTORY
Pharmacy Note - Admission Medication History    Pertinent Provider Information: Pt's dad can bring in her Flovent today. She states she will not need her Adderall while inpatient. Triamcinolone is prn for excema.      ______________________________________________________________________    Prior To Admission (PTA) med list completed and updated in EMR.       PTA Med List   Medication Sig Note Last Dose     albuterol (PROAIR HFA/PROVENTIL HFA/VENTOLIN HFA) 108 (90 Base) MCG/ACT inhaler Inhale 1-2 puffs into the lungs every 4 hours as needed for wheezing  10/12/2021 at Unknown time     amphetamine-dextroamphetamine (ADDERALL XR) 20 MG 24 hr capsule Take 40 mg by mouth daily 10/13/2021: Will not need inpatient 10/12/2021 at Unknown time     FLOVENT  MCG/ACT inhaler Inhale 2 puffs into the lungs 2 times daily  10/12/2021 at will bring     ibuprofen (ADVIL/MOTRIN) 400 MG tablet Take 400 mg by mouth every 6 hours as needed  Past Month at prn     montelukast (SINGULAIR) 10 MG tablet Take 10 mg by mouth daily  10/11/2021 at Unknown time     nortriptyline (PAMELOR) 50 MG capsule Take 60 mg by mouth daily   10/11/2021     sertraline (ZOLOFT) 50 MG tablet Take 50 mg by mouth daily  10/12/2021 at Unknown time     triamcinolone (KENALOG) 0.1 % external cream Apply 1 Application topically 2 times daily as needed (excema)   Past Month at prn       Information source(s): Patient and CareEverywhere/SureScripts  Method of interview communication: in-person    Summary of Changes to PTA Med List  New: all meds new to system  Discontinued: none  Changed: none    Patient was asked about OTC/herbal products specifically.  PTA med list reflects this.    In the past week, patient estimated taking medication this percent of the time:  greater than 90%.    Allergies were reviewed, assessed, and updated with the patient.      Medications currently not available for use during hospital stay. Family/Patient representative states they  Pt returned from Dialysis. Pt is stable no increased bleeding noted. Pt has dressing in place to upper right chest. Clean dry and intact. Will continue to monitor. will bring Flovent to [facility].    The information provided in this note is only as accurate as the sources available at the time of the update(s).    Thank you for the opportunity to participate in the care of this patient.    Mirela Fink [facility]  10/13/2021 9:00 AM

## 2021-10-13 NOTE — ED PROVIDER NOTES
40 yo F, otherwise healthy, being sent from [facility] for admission of possible new onset CHF. Patient with new onset 2+ pitting edema BL lower extremities.  Troponin slightly elevated (0.31).  CT scan demonstrated edema.  UR unable to check BNP.  Patient mild tachycardia and tachypnea with good O2 sats.     COVID test negative.      Demetrice Arteaga MD  10/12/21 2154       Demetrice Arteaga MD  10/12/21 2154

## 2021-10-13 NOTE — PROVIDER NOTIFICATION
Paged Dr Bhatt to let him know that [facility] does not have a bed. Asked if pt needed emergent bed for cardiac. Per Dr Bhatt, pt can be admitted to Cardiac Tele here but will need to continue to look for a bed for Cardiac MRI and workup. Advised ER Change Danni and [address] [facility] Mortensen.

## 2021-10-13 NOTE — H&P
[facility]    History and Physical - Hospitalist Service       Date of Admission:  10/12/2021    Assessment & Plan      Tereza Suarez is a 41 year old female admitted on 10/12/2021. She was evaluated in the emergency department and will be boarding there overnight as well.    1/. Shortness of breath likely related to heart failure  Patient is toxic subjective shortness of breath only here.  2/. Heart failure likely diastolic in nature multifactorial in nature related to hypertensive heart disease which has been likely longstanding and untreated, concurrent use of Adderall, alcohol use.  Will obtain a cardiac echogram, cardiology consult.  Patient be started on Aldactone as well as Lasix will also be started on ACE inhibitor and a beta-blocker.  3/. Tobacco use: Patient does use e-cigarettes on a regular basis she is counseled not to pursue this anymore  She declined replacement products.  4/. Hypertension: Longstanding and untreated.  As mentioned below dates back to her pregnancy 15 years ago.  Beta-blocker and ACE inhibitor to be initiated.  Will need titration as appropriate       Diet: Combination Diet Regular Diet Adult    DVT Prophylaxis: Low Risk/Ambulatory with no VTE prophylaxis indicated  Frye Catheter: Not present  Central Lines: None  Code Status: Full Code                       Disposition Plan   Expected discharge: 1-2 days recommended to prior living arrangement once Cardiac status is stabilized.     The patient's care was discussed with the Bedside Nurse and Patient.    Catarina Zamora MD, [facility]  Securely message with the Vocera Web Console (learn more here)  Text page via Moximed Paging/Directory      ______________________________________________________________________    Chief Complaint   Shortness of breath    History is obtained from the patient  Additional information by ER provider and electronic records    History of Present  Illness   Tereza Suarez is a 41 year old female who has a past medical history of attention deficit disorder since childhood chronic Adderall therapy, e-cigarette use, binge alcohol consumption, preeclampsia.  Patient is being admitted through the emergency department with above issue.  Patient was evaluated in the emergency room.  According to the patient her original set of issues in the context of shortness of breath started approximately 11 months ago last November.  She states she initially did not pay attention to her symptoms and then she did go to see her primary care doctor brought up the issue of this being possibly reactive airway disease and prescribed an inhaler.  Notably patient has had hypertension for at least 2 years and has been advised to monitor her blood pressure at home which she has not been doing.  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  Patient tells me she has also noticed that for the last 2-3 weeks she has had increasing lower extremity swelling.  On exam, more short of breath and finally decided to present to the local emergency room.  Work-up today was concerning for pulmonary edema amongst other things she was administered 40 mg of IV Lasix and because of logistical reasons she was sent to the emergency department at [facility].  On arrival in the ER she was not hypoxic but she felt short of breath.  She was noted to have significantly elevated proBNP she was also tachycardic and hyper tensive. She was ruled out for PE  She has since been requested for admission with a diagnosis of new onset heart failure.  At the time of my evaluation patient tells me she feels much better since she was presented to the emergency room.  At my request she has received a second dose of IV Lasix and 2.5 mg of IV metoprolol.  She states that she does not have a cough chest pain, nausea,  paresis.  She is fully vaccinated against COVID-19  She lives at home with her father and son who is 15 years old      Review of Systems    The 10 point Review of Systems is negative other than noted in the HPI or here.     Past Medical History    I have reviewed this patient's medical history and updated it with pertinent information if needed.       Past Surgical History   I have reviewed this patient's surgical history and updated it with pertinent information if needed.  History reviewed. No pertinent surgical history.    Social History   I have reviewed this patient's social history and updated it with pertinent information if needed.  Social History     Tobacco Use     Smoking status:  Konnektid   Substance Use Topics     Alcohol use: Binge every week okay     Drug use: None       Family History     No significant family history, including no history of: Early coronary artery disease or sudden cardiac    Prior to Admission Medications   None     Allergies   No Known Allergies    Physical Exam   Vital Signs: Temp: 98.9  F (37.2  C) Temp src: Oral BP: (!) 162/119 Pulse: 92   Resp: 23 SpO2: 97 % O2 Device: None (Room air)    Weight: 120 lbs 0 oz    General Appearance: Alert awake oriented x3 no acute distress  Eyes: PERRLA  HEENT: Oropharynx is clear  Respiratory: Diminished basal breath sound secondary to poor  Cardiovascular: S1-S2 regular rate and rhythm  GI: Soft, nontender nondistended bowel sounds are present skin has a doughy feel    Skin: Multiple tattoos  Musculoskeletal: No joint deformity  Neurologic: No obvious neurological deficit  Psychiatric: Pleasant and cooperative  3+ pitting edema lower extremity    Data   Data reviewed today: I reviewed all medications, new labs and imaging results over the last 24 hours. I personally reviewed the EKG tracing showing No STEMI and the chest CT image(s) showing As below.    Recent Labs   Lab 10/12/21  2248   INR 1.29*     Recent Results (from the past 24 hour(s))    CT Abdomen Pelvis w/o Contrast    Narrative    EXAM: CT ABDOMEN PELVIS W/O CONTRAST  LOCATION: [facility]  DATE/TIME: 10/12/2021 11:24 PM    INDICATION: Abdominal distension  COMPARISON: None.  TECHNIQUE: CT scan of the abdomen and pelvis was performed without IV contrast. Multiplanar reformats were obtained. Dose reduction techniques were used.  CONTRAST: None.    FINDINGS:   LOWER CHEST: Moderate right and small left pleural effusions. Small pericardial effusion. Cardiomegaly.    HEPATOBILIARY: Normal.    PANCREAS: Normal.    SPLEEN: Normal.    ADRENAL GLANDS: Normal.    KIDNEYS/BLADDER: Normal.    BOWEL: Mild ascites. No free air. No bowel obstruction.    LYMPH NODES: Normal.    VASCULATURE: Unremarkable.    PELVIC ORGANS: Normal.    MUSCULOSKELETAL: Mild diffuse subcutaneous edema.      Impression    IMPRESSION:   1.  Mild ascites and subcutaneous edema.  2.  Lateral right and small left pleural effusions.  3.  Small pericardial effusion.

## 2021-10-13 NOTE — CONSULTS
Care Management Initial Consult    General Information  Assessment completed with: Patient,    Type of CM/SW Visit: Initial Assessment    Primary Care Provider verified and updated as needed: Yes   Readmission within the last 30 days:        Reason for Consult: discharge planning  Advance Care Planning: Advance Care Planning Reviewed: other (comment) (Declined Honoring Choices information)          Communication Assessment  Patient's communication style: spoken language (English or Bilingual)    Hearing Difficulty or Deaf: no   Wear Glasses or Blind: no    Cognitive  Cognitive/Neuro/Behavioral: WDL                      Living Environment:   People in home: child(yohan), dependent, parent(s)     Current living Arrangements: condominium      Able to return to prior arrangements: yes       Family/Social Support:  Care provided by: self  Provides care for: child(yohan)  Marital Status: Single  Parent(s)          Description of Support System: Supportive, Involved    Support Assessment: Patient communicates needs well met    Current Resources:   Patient receiving home care services: No     Community Resources: None  Equipment currently used at home: none  Supplies currently used at home: None    Employment/Financial:  Employment Status: employed full-time        Financial Concerns: No concerns identified           Lifestyle & Psychosocial Needs:  Social Determinants of Health     Tobacco Use: Unknown     Smoking Tobacco Use: Never Smoker     Smokeless Tobacco Use: Unknown   Alcohol Use:      Frequency of Alcohol Consumption:      Average Number of Drinks:      Frequency of Binge Drinking:    Financial Resource Strain:      Difficulty of Paying Living Expenses:    Food Insecurity:      Worried About Running Out of Food in the Last Year:      Ran Out of Food in the Last Year:    Transportation Needs:      Lack of Transportation (Medical):      Lack of Transportation (Non-Medical):    Physical Activity:      Days of Exercise  per Week:      Minutes of Exercise per Session:    Stress:      Feeling of Stress :    Social Connections:      Frequency of Communication with Friends and Family:      Frequency of Social Gatherings with Friends and Family:      Attends Sabianism Services:      Active Member of Clubs or Organizations:      Attends Club or Organization Meetings:      Marital Status:    Intimate Partner Violence:      Fear of Current or Ex-Partner:      Emotionally Abused:      Physically Abused:      Sexually Abused:    Depression:      PHQ-2 Score:    Housing Stability:      Unable to Pay for Housing in the Last Year:      Number of Places Lived in the Last Year:      Unstable Housing in the Last Year:        Functional Status:  Prior to admission patient needed assistance:   Dependent ADLs:: Independent  Dependent IADLs:: Independent  Assesssment of Functional Status: Not at  functional baseline    Mental Health Status:          Chemical Dependency Status:                Values/Beliefs:  Spiritual, Cultural Beliefs, Sabianism Practices, Values that affect care:                 Additional Information:  Alert and oriented patient lives in a private residence with 15 year-old son and father. Is independent with all ASLs.  No assistive devices; no home care services; does drive. Declined Honoring Choices information. Plans to return home with father Erik transporting patient on discharge.    JUDY GARRIDO RN

## 2021-10-13 NOTE — ED PROVIDER NOTES
EMERGENCY DEPARTMENT ENCOUnter      NAME: Tereza Suarez  AGE: 41 year old female  YOB: 1980  MRN: [id number]  EVALUATION DATE & TIME: 10/12/2021 10:25 PM    PCP: No primary care provider on file.    ED PROVIDER: Jayda Malave MD      Chief Complaint   Patient presents with     Shortness of Breath         FINAL IMPRESSION:  1. Acute pulmonary edema (H)    2. Hypertensive urgency    3. Pericardial effusion    4. Pleural effusion, bilateral    5. Elevated liver function tests          ED COURSE & MEDICAL DECISION MAKING:      In summary, the patient is a 41-year-old female that is transferred from the emergency room for evaluation of new onset pulmonary and peripheral edema.  Blood pressure is likely playing a role in her pulmonary edema.  It is unclear why she has such severe hypertension.    10:37 PM I met with the patient for the initial interview and physical examination. Discussed plan for treatment and workup in the ED. PPE: Provider wore N95 mask.  Nitroglycerin ointment 1 inch was applied topically.  2345-patient's blood pressure is unchanged after nitroglycerin ointment application.  We will initiate a nitroglycerin infusion for blood pressure control and treatment of her pulmonary edema.  Reviewed previous medical records.  12:20 AM I discussed the case with hospitalist, Dr. Zamora.   12:26 AM I rechecked and updated the patient about results.   0100-Dr. Zamora saw in the ED.  Patient is boarding in the ED since there are no hospital beds in the [facility] or at [facility].      At the conclusion of the encounter I discussed the results of all of the tests and the disposition. The questions were answered. The patient or family acknowledged understanding and was agreeable with the care plan.         MEDICATIONS GIVEN IN THE EMERGENCY:  Medications   nitroGLYcerin 50 mg in D5W 250 mL (adult std) infusion CENTRAL (30 mcg/min Intravenous Rate/Dose Verify 10/13/21 0028)   furosemide  "(LASIX) injection 40 mg (has no administration in time range)   metoprolol (LOPRESSOR) injection 2.5 mg (has no administration in time range)       NEW PRESCRIPTIONS STARTED AT TODAY'S ER VISIT  New Prescriptions    No medications on file          =================================================================    HPI        Tereza Suarez is a 41 year old female with a pertinent history of asthma who presents to this ED by ambulance for evaluation of leg swelling and shortness of breath.     Per chart review, patient was seen at the Urgency Room earlier today (10/12/2021) for leg edema, sore throat, and cough. COVID-19 testing was negative. Chest x-ray showed mild cardiomegaly; no evidence for CHF or pneumonia; no pleural effusion or pneumothorax. CTA chest showed pulmonary edema and cardiomegaly but no PE. Patient was transported to the ER for further evaluation and admission.       Patient reports 2 weeks of a \"bad\" cough, shortness of breath, and bilateral lower extremity swelling. She states that she was diagnosed with asthma 3-4 months ago, so she initially attributed her shortness of breath to this diagnosis. She denies fever, chest pain, vomiting, diarrhea, abdominal pain, urinary symptoms, numbness/tingling in her feet, or additional symptoms at this time. Patient states that she has not had COVID-19 and she received the [employer] vaccine. No recent travel. Patient takes daily sertraline, nortriptyline, and Singulair. No known allergies. She denies chance of pregnancy. Her LMP was 2 weeks ago and was normal for her.     Social history: Patient is adopted. She quit smoking cigarettes 5 years ago and has since been vaping. She reports occasional alcohol use. No other drug use.       REVIEW OF SYSTEMS     Constitutional:  Denies fever or chills  HENT:  Denies sore throat   Respiratory:  Positive for cough and shortness of breath.   Cardiovascular: Positive for leg swelling. Denies chest pain or " palpitations  GI:  Denies abdominal pain, nausea, or vomiting  : Denies difficulty urinating  Musculoskeletal:  Denies any new extremity pain   Skin:  Denies rash   Neurologic:  Denies headache, focal weakness or sensory changes    All other systems reviewed and are negative      PAST MEDICAL HISTORY:  History reviewed. No pertinent past medical history.        CURRENT MEDICATIONS:    No current outpatient medications on file.      ALLERGIES:  No Known Allergies    FAMILY HISTORY:  History reviewed. No pertinent family history.    SOCIAL HISTORY:   Social History     Socioeconomic History     Marital status: Single     Spouse name: None     Number of children: None     Years of education: None     Highest education level: None   Occupational History     None   Tobacco Use     Smoking status: None   Substance and Sexual Activity     Alcohol use: None     Drug use: None     Sexual activity: None   Other Topics Concern     None   Social History Narrative     None     Social Determinants of Health     Financial Resource Strain:      Difficulty of Paying Living Expenses:    Food Insecurity:      Worried About Running Out of Food in the Last Year:      Ran Out of Food in the Last Year:    Transportation Needs:      Lack of Transportation (Medical):      Lack of Transportation (Non-Medical):    Physical Activity:      Days of Exercise per Week:      Minutes of Exercise per Session:    Stress:      Feeling of Stress :    Social Connections:      Frequency of Communication with Friends and Family:      Frequency of Social Gatherings with Friends and Family:      Attends [employer] Services:      Active Member of Clubs or Organizations:      Attends Club or Organization Meetings:      Marital Status:    Intimate Partner Violence:      Fear of Current or Ex-Partner:      Emotionally Abused:      Physically Abused:      Sexually Abused:        VITALS:  Patient Vitals for the past 24 hrs:   BP Temp Temp src Pulse Resp SpO2  "Height Weight   10/13/21 0020 (!) 169/117 -- -- 96 23 96 % -- --   10/13/21 0015 (!) 170/117 -- -- 97 25 97 % -- --   10/13/21 0010 (!) 173/119 -- -- 98 26 97 % -- --   10/12/21 2315 (!) 174/127 -- -- 101 27 99 % -- --   10/12/21 2300 (!) 176/125 -- -- 105 19 95 % -- --   10/12/21 2245 (!) 177/127 -- -- 103 24 98 % -- --   10/12/21 2229 (!) 167/123 98.9  F (37.2  C) Oral 107 18 100 % 1.448 m (4' 9\") 54.4 kg (120 lb)       PHYSICAL EXAM    Constitutional:  Well developed, Well nourished,  HENT:  Normocephalic, Atraumatic, Bilateral external ears normal, Oropharynx moist, Nose normal.   Neck:  Normal range of motion, No meningismus, No stridor.   Eyes:  EOMI, Conjunctiva normal, No discharge.   Respiratory:  Normal breath sounds, No respiratory distress, No wheezing, No chest tenderness.   Cardiovascular:  Normal heart rate, Normal rhythm, No murmurs  GI:  Soft, No tenderness, demented mildly distended no guarding, No CVA tenderness.   Musculoskeletal:  Neurovascularly intact distally, 2+ pitting edema bilaterally, No tenderness, No cyanosis, Good range of motion in all major joints. No tenderness to palpation or major deformities noted.   Integument:  Warm, Dry, No erythema, No rash.   Lymphatic:  No lymphadenopathy noted.   Neurologic:  Alert & oriented x 3, Normal motor function, Normal sensory function, No focal deficits noted.   Psychiatric:  Affect normal, Judgment normal, Mood normal.      LAB:  All pertinent labs reviewed and interpreted.  Results for orders placed or performed during the hospital encounter of 10/12/21   CT Abdomen Pelvis w/o Contrast    Impression    IMPRESSION:   1.  Mild ascites and subcutaneous edema.  2.  Lateral right and small left pleural effusions.  3.  Small pericardial effusion.     B-Type Natriuretic Peptide ([facility] Only)   Result Value Ref Range    BNP 1,421 (H) 0 - 64 pg/mL   Result Value Ref Range    Troponin I 0.04 0.00 - 0.29 ng/mL   Result Value Ref Range    INR 1.29 (H) " 0.85 - 1.15   CRP inflammation   Result Value Ref Range    CRP 1.3 (H) 0.0-<0.8 mg/dL   TSH with free T4 reflex   Result Value Ref Range    TSH 1.21 0.30 - 5.00 uIU/mL       RADIOLOGY:  I have independently reviewed and interpreted the above imaging, pending the final radiology read.  CT Abdomen Pelvis w/o Contrast   Final Result   IMPRESSION:    1.  Mild ascites and subcutaneous edema.   2.  Lateral right and small left pleural effusions.   3.  Small pericardial effusion.             EK-rate is 105, sinus, there is no ST segment elevation or depression appreciated.  Nonspecific T wave abnormality, rightward axis.  No previous EKGs for comparison.    I have independently reviewed and interpreted this EKG          I, Janel Howe, am serving as a scribe to document services personally performed by Dr. Malave based on my observation and the provider's statements to me. I, Jayda Malave MD attest that Janel Howe is acting in a scribe capacity, has observed my performance of the services and has documented them in accordance with my direction.    Jayda Malave MD  Emergency Medicine  [facility] EMERGENCY ROOM  [address] [address] [phone]  Dept: [phone]     Jayda Malave MD  10/13/21 6332

## 2021-10-13 NOTE — PHARMACY-ADMISSION MEDICATION HISTORY
Admission Medication History was completed at [facility] prior to patient transfer.  See note from that encounter.  Thank you,  Bettie Bertrand [facility], 10/13/2021, 2:46 PM

## 2021-10-13 NOTE — CONSULTS
Please see detailed consult note by Dr. Bhatt completed today. Cardiology will continue to follow with you.    Alexander Rhodes MD., MHS  10/13/21

## 2021-10-13 NOTE — PROGRESS NOTES
Called floor to notify patient to have cardiac MRI stress test tomorrow 10/14 @ 1100. Please be sure IV working properly prior to test, NPO 3 hours prior to test time, no caffeine products 12 hours prior to test. Held diuretics due tomorrow AM in MAR per MRI stress policy.   Renetta Castro RN  [facility]

## 2021-10-13 NOTE — ED TRIAGE NOTES
Pt was sent here from the urgency room for further evaluation. Pt went in to see them with SOB and diagnosed with fluid on her lungs, she has 3+ pitting edema bilaterally on her legs.   Pt had a neg covid at the urgency room  40 mg of lasix given at urgency room

## 2021-10-14 ENCOUNTER — APPOINTMENT (OUTPATIENT)
Dept: MRI IMAGING | Facility: HOSPITAL | Age: 41
DRG: 291 | End: 2021-10-14
Attending: INTERNAL MEDICINE
Payer: COMMERCIAL

## 2021-10-14 ENCOUNTER — APPOINTMENT (OUTPATIENT)
Dept: ULTRASOUND IMAGING | Facility: HOSPITAL | Age: 41
DRG: 291 | End: 2021-10-14
Attending: INTERNAL MEDICINE
Payer: COMMERCIAL

## 2021-10-14 PROBLEM — E87.6 HYPOKALEMIA: Status: ACTIVE | Noted: 2021-10-14

## 2021-10-14 PROBLEM — E83.42 HYPOMAGNESEMIA: Status: ACTIVE | Noted: 2021-10-14

## 2021-10-14 LAB
ALBUMIN SERPL-MCNC: 2.9 G/DL (ref 3.5–5)
ALP SERPL-CCNC: 77 U/L (ref 45–120)
ALT SERPL W P-5'-P-CCNC: 278 U/L (ref 0–45)
ANION GAP SERPL CALCULATED.3IONS-SCNC: 9 MMOL/L (ref 5–18)
AST SERPL W P-5'-P-CCNC: 266 U/L (ref 0–40)
ATRIAL RATE - MUSE: 68 BPM
ATRIAL RATE - MUSE: 69 BPM
BILIRUB SERPL-MCNC: 0.9 MG/DL (ref 0–1)
BUN SERPL-MCNC: 11 MG/DL (ref 8–22)
CALCIUM SERPL-MCNC: 8.5 MG/DL (ref 8.5–10.5)
CHLORIDE BLD-SCNC: 99 MMOL/L (ref 98–107)
CO2 SERPL-SCNC: 34 MMOL/L (ref 22–31)
CREAT SERPL-MCNC: 0.7 MG/DL (ref 0.6–1.1)
DIASTOLIC BLOOD PRESSURE - MUSE: NORMAL MMHG
DIASTOLIC BLOOD PRESSURE - MUSE: NORMAL MMHG
FOLATE SERPL-MCNC: 14.5 NG/ML
GFR SERPL CREATININE-BSD FRML MDRD: >90 ML/MIN/1.73M2
GLUCOSE BLD-MCNC: 85 MG/DL (ref 70–125)
HOLD SPECIMEN: NORMAL
INTERPRETATION ECG - MUSE: NORMAL
INTERPRETATION ECG - MUSE: NORMAL
MAGNESIUM SERPL-MCNC: 1.2 MG/DL (ref 1.8–2.6)
P AXIS - MUSE: 0 DEGREES
P AXIS - MUSE: 2 DEGREES
POTASSIUM BLD-SCNC: 2.4 MMOL/L (ref 3.5–5)
POTASSIUM BLD-SCNC: 3.2 MMOL/L (ref 3.5–5)
POTASSIUM BLD-SCNC: 3.6 MMOL/L (ref 3.5–5)
PR INTERVAL - MUSE: 204 MS
PR INTERVAL - MUSE: 210 MS
PROT SERPL-MCNC: 5.3 G/DL (ref 6–8)
QRS DURATION - MUSE: 108 MS
QRS DURATION - MUSE: 108 MS
QT - MUSE: 526 MS
QT - MUSE: 528 MS
QTC - MUSE: 561 MS
QTC - MUSE: 563 MS
R AXIS - MUSE: 34 DEGREES
R AXIS - MUSE: 52 DEGREES
SODIUM SERPL-SCNC: 142 MMOL/L (ref 136–145)
SYSTOLIC BLOOD PRESSURE - MUSE: NORMAL MMHG
SYSTOLIC BLOOD PRESSURE - MUSE: NORMAL MMHG
T AXIS - MUSE: -50 DEGREES
T AXIS - MUSE: -52 DEGREES
VENTRICULAR RATE- MUSE: 68 BPM
VENTRICULAR RATE- MUSE: 69 BPM
VIT B12 SERPL-MCNC: 1194 PG/ML (ref 213–816)

## 2021-10-14 PROCEDURE — 82607 VITAMIN B-12: CPT | Performed by: INTERNAL MEDICINE

## 2021-10-14 PROCEDURE — 93017 CV STRESS TEST TRACING ONLY: CPT | Performed by: INTERNAL MEDICINE

## 2021-10-14 PROCEDURE — 250N000013 HC RX MED GY IP 250 OP 250 PS 637: Performed by: HOSPITALIST

## 2021-10-14 PROCEDURE — 210N000001 HC R&B IMCU HEART CARE

## 2021-10-14 PROCEDURE — 93010 ELECTROCARDIOGRAM REPORT: CPT | Performed by: INTERNAL MEDICINE

## 2021-10-14 PROCEDURE — 75563 CARD MRI W/STRESS IMG & DYE: CPT

## 2021-10-14 PROCEDURE — 83735 ASSAY OF MAGNESIUM: CPT | Performed by: HOSPITALIST

## 2021-10-14 PROCEDURE — 36415 COLL VENOUS BLD VENIPUNCTURE: CPT | Performed by: INTERNAL MEDICINE

## 2021-10-14 PROCEDURE — 93016 CV STRESS TEST SUPVJ ONLY: CPT | Performed by: INTERNAL MEDICINE

## 2021-10-14 PROCEDURE — 76705 ECHO EXAM OF ABDOMEN: CPT

## 2021-10-14 PROCEDURE — 36415 COLL VENOUS BLD VENIPUNCTURE: CPT | Performed by: HOSPITALIST

## 2021-10-14 PROCEDURE — 84132 ASSAY OF SERUM POTASSIUM: CPT | Performed by: INTERNAL MEDICINE

## 2021-10-14 PROCEDURE — 83735 ASSAY OF MAGNESIUM: CPT | Performed by: STUDENT IN AN ORGANIZED HEALTH CARE EDUCATION/TRAINING PROGRAM

## 2021-10-14 PROCEDURE — 250N000013 HC RX MED GY IP 250 OP 250 PS 637: Performed by: INTERNAL MEDICINE

## 2021-10-14 PROCEDURE — 99233 SBSQ HOSP IP/OBS HIGH 50: CPT | Performed by: INTERNAL MEDICINE

## 2021-10-14 PROCEDURE — 250N000013 HC RX MED GY IP 250 OP 250 PS 637: Performed by: FAMILY MEDICINE

## 2021-10-14 PROCEDURE — 82746 ASSAY OF FOLIC ACID SERUM: CPT | Performed by: INTERNAL MEDICINE

## 2021-10-14 PROCEDURE — 999N000122 MR MYOCARDIUM  OVERREAD

## 2021-10-14 PROCEDURE — 80053 COMPREHEN METABOLIC PANEL: CPT | Performed by: FAMILY MEDICINE

## 2021-10-14 PROCEDURE — 250N000011 HC RX IP 250 OP 636: Performed by: HOSPITALIST

## 2021-10-14 PROCEDURE — 99232 SBSQ HOSP IP/OBS MODERATE 35: CPT | Performed by: INTERNAL MEDICINE

## 2021-10-14 PROCEDURE — 250N000011 HC RX IP 250 OP 636: Performed by: INTERNAL MEDICINE

## 2021-10-14 PROCEDURE — 75563 CARD MRI W/STRESS IMG & DYE: CPT | Mod: 26 | Performed by: INTERNAL MEDICINE

## 2021-10-14 PROCEDURE — 93005 ELECTROCARDIOGRAM TRACING: CPT

## 2021-10-14 PROCEDURE — 93005 ELECTROCARDIOGRAM TRACING: CPT | Performed by: INTERNAL MEDICINE

## 2021-10-14 PROCEDURE — 255N000002 HC RX 255 OP 636: Performed by: INTERNAL MEDICINE

## 2021-10-14 PROCEDURE — A9585 GADOBUTROL INJECTION: HCPCS | Performed by: INTERNAL MEDICINE

## 2021-10-14 PROCEDURE — 258N000003 HC RX IP 258 OP 636: Performed by: INTERNAL MEDICINE

## 2021-10-14 PROCEDURE — 93018 CV STRESS TEST I&R ONLY: CPT | Performed by: INTERNAL MEDICINE

## 2021-10-14 PROCEDURE — 84132 ASSAY OF SERUM POTASSIUM: CPT | Performed by: HOSPITALIST

## 2021-10-14 RX ORDER — METOPROLOL SUCCINATE 25 MG/1
25 TABLET, EXTENDED RELEASE ORAL DAILY
Status: DISCONTINUED | OUTPATIENT
Start: 2021-10-14 | End: 2021-10-16 | Stop reason: HOSPADM

## 2021-10-14 RX ORDER — GADOBUTROL 604.72 MG/ML
16 INJECTION INTRAVENOUS ONCE
Status: COMPLETED | OUTPATIENT
Start: 2021-10-14 | End: 2021-10-14

## 2021-10-14 RX ORDER — FUROSEMIDE 10 MG/ML
80 INJECTION INTRAMUSCULAR; INTRAVENOUS ONCE
Status: COMPLETED | OUTPATIENT
Start: 2021-10-14 | End: 2021-10-14

## 2021-10-14 RX ORDER — POTASSIUM CHLORIDE 20MEQ/15ML
10 LIQUID (ML) ORAL ONCE
Status: COMPLETED | OUTPATIENT
Start: 2021-10-14 | End: 2021-10-14

## 2021-10-14 RX ORDER — MAGNESIUM SULFATE HEPTAHYDRATE 40 MG/ML
2 INJECTION, SOLUTION INTRAVENOUS ONCE
Status: COMPLETED | OUTPATIENT
Start: 2021-10-14 | End: 2021-10-14

## 2021-10-14 RX ORDER — POTASSIUM CHLORIDE 7.45 MG/ML
10 INJECTION INTRAVENOUS ONCE
Status: COMPLETED | OUTPATIENT
Start: 2021-10-14 | End: 2021-10-14

## 2021-10-14 RX ORDER — REGADENOSON 0.08 MG/ML
0.4 INJECTION, SOLUTION INTRAVENOUS ONCE
Status: COMPLETED | OUTPATIENT
Start: 2021-10-14 | End: 2021-10-14

## 2021-10-14 RX ORDER — POTASSIUM CHLORIDE 20MEQ/15ML
40 LIQUID (ML) ORAL ONCE
Status: COMPLETED | OUTPATIENT
Start: 2021-10-14 | End: 2021-10-14

## 2021-10-14 RX ADMIN — FAMOTIDINE 20 MG: 20 TABLET, FILM COATED ORAL at 08:30

## 2021-10-14 RX ADMIN — FUROSEMIDE 80 MG: 10 INJECTION, SOLUTION INTRAMUSCULAR; INTRAVENOUS at 13:48

## 2021-10-14 RX ADMIN — LISINOPRIL 10 MG: 5 TABLET ORAL at 08:30

## 2021-10-14 RX ADMIN — DOXYLAMINE SUCCINATE 25 MG: 25 TABLET ORAL at 21:44

## 2021-10-14 RX ADMIN — GADOBUTROL 16 ML: 604.72 INJECTION INTRAVENOUS at 12:46

## 2021-10-14 RX ADMIN — METOPROLOL SUCCINATE 25 MG: 25 TABLET, EXTENDED RELEASE ORAL at 08:30

## 2021-10-14 RX ADMIN — MAGNESIUM SULFATE HEPTAHYDRATE 2 G: 40 INJECTION, SOLUTION INTRAVENOUS at 09:33

## 2021-10-14 RX ADMIN — NORTRIPTYLINE HYDROCHLORIDE 60 MG: 50 CAPSULE ORAL at 21:45

## 2021-10-14 RX ADMIN — FAMOTIDINE 20 MG: 20 TABLET, FILM COATED ORAL at 21:45

## 2021-10-14 RX ADMIN — POTASSIUM CHLORIDE 40 MEQ: 1.5 SOLUTION ORAL at 06:23

## 2021-10-14 RX ADMIN — POTASSIUM CHLORIDE 10 MEQ: 20 SOLUTION ORAL at 17:01

## 2021-10-14 RX ADMIN — MONTELUKAST 10 MG: 10 TABLET, FILM COATED ORAL at 21:45

## 2021-10-14 RX ADMIN — FUROSEMIDE 10 MG/HR: 10 INJECTION, SOLUTION INTRAVENOUS at 23:01

## 2021-10-14 RX ADMIN — SERTRALINE HYDROCHLORIDE 50 MG: 50 TABLET ORAL at 08:30

## 2021-10-14 RX ADMIN — FUROSEMIDE 10 MG/HR: 10 INJECTION, SOLUTION INTRAVENOUS at 13:49

## 2021-10-14 RX ADMIN — REGADENOSON 0.4 MG: 0.08 INJECTION, SOLUTION INTRAVENOUS at 12:16

## 2021-10-14 RX ADMIN — POTASSIUM CHLORIDE 10 MEQ: 7.46 INJECTION, SOLUTION INTRAVENOUS at 06:26

## 2021-10-14 ASSESSMENT — ACTIVITIES OF DAILY LIVING (ADL): DEPENDENT_IADLS:: INDEPENDENT

## 2021-10-14 NOTE — PROGRESS NOTES
[facility]    PROGRESS NOTE - Hospitalist Service    Assessment and Plan    Principal Problem:    Acute systolic congestive heart failure (H)  Active Problems:    Pericardial effusion    Acute pulmonary edema (H)    Elevated liver function tests    Pleural effusion, bilateral    Hypertensive urgency    Attention deficit hyperactivity disorder (ADHD)    Hypokalemia    Hypomagnesemia    Tereza Suarez is a 41 year old old female with h/o hypertension, attention deficit disorder since childhood on chronic Adderall therapy, e-cigarette use, binge alcohol consumption, history of preeclampsia, first presented to urgency room with complaint of acute on chronic dyspnea and progressive fatigue.  D-dimer was found to be elevated and work-up included pulmonary CTA which was negative for pulmonary embolism but showed pulmonary edema.  She was then referred to [facility] ED where she has been boarding until her transfer to [facility].     Patient recalls shortness of breath starting approximately 11 months ago last November. When persisted, she visited her primary care doctor who considered this might be reactive airway disease and prescribed an inhaler.     Patient reports for the last 2-3 weeks she has had increasing lower extremity swelling, progressive fatigue and breathlessness.  She is fully vaccinated against COVID-19  She lives at home with her father and son who is 15 years old    Acute systolic congestive heart failure:  Severe cardiomyopathy:  - Echocardiogram shows EF 10 to 15% with severe global hypokinesia  - CT pulmonary angiogram negative for PE  - Cardiology following started on IV lasix drip  - full cardiac work-up including cardiac MRI and possibly angiogram  - spironolactone and metoprolol   - holding adderall for now     Tobacco use: Patient does use e-cigarettes on a regular basis she is counseled not to pursue this anymore    Hypertension: Longstanding and untreated. dates back to  her pregnancy 15 years ago.  - medications as above     Elevated liver enzymes: Most likely related to passive liver congestion.    - normal US of liver  - trend labs     Macrocytosis:  - B12 normal   - does use alcohol     Hypomag and hypokalemia   - potassium replacement protocol   - 2gm IV mag  - recheck labs in am      Overweight w BMI 25-29.9: Body mass index is 28.52 kg/m .     VTE prophylaxis:  Pneumatic Compression Devices  DIET: Orders Placed This Encounter      NPO for Medical/Clinical Reasons Except for: Meds    Drains/Lines: none  Weight bearing status: WBAT  Disposition/Barriers to discharge: pending cardiology further recs   Code Status: Full Code    Subjective:  Breathing improved today     PHYSICAL EXAM  Temp:  [97.6  F (36.4  C)-98  F (36.7  C)] 97.8  F (36.6  C)  Pulse:  [66-76] 71  Resp:  [18] 18  BP: (106-146)/(60-85) 128/78  SpO2:  [91 %-97 %] 94 %  Wt Readings from Last 1 Encounters:   10/14/21 54.8 kg (120 lb 12.8 oz)       Intake/Output Summary (Last 24 hours) at 10/14/2021 0817  Last data filed at 10/14/2021 0400  Gross per 24 hour   Intake 123 ml   Output 2000 ml   Net -1877 ml      Body mass index is 26.14 kg/m .    Physical Exam  Constitutional:       Appearance: Normal appearance.   HENT:      Head: Normocephalic and atraumatic.   Cardiovascular:      Rate and Rhythm: Normal rate and regular rhythm.      Pulses: Normal pulses.      Heart sounds: Normal heart sounds.   Pulmonary:      Effort: Pulmonary effort is normal.      Breath sounds: Normal breath sounds.   Abdominal:      General: Bowel sounds are normal.      Palpations: Abdomen is soft.   Musculoskeletal:      Comments: BLE 2+ Edema    Skin:     General: Skin is warm and dry.   Neurological:      General: No focal deficit present.      Mental Status: She is alert and oriented to person, place, and time. Mental status is at baseline.   Psychiatric:         Mood and Affect: Mood normal.         Behavior: Behavior normal.          PERTINENT LABS/IMAGING:  No results found for this visit on 10/13/21.  Most Recent 3 CBC's:No lab results found.  Most Recent 3 BMP's:Recent Labs   Lab Test 10/14/21  1412 10/14/21  0641 10/14/21  0434 10/13/21  2047   NA  --   --  142  --    POTASSIUM 3.6 3.2* 2.4*  --    CHLORIDE  --   --  99  --    CO2  --   --  34*  --    BUN  --   --  11  --    CR  --   --  0.70  --    ANIONGAP  --   --  9  --    DELORES  --   --  8.5  --    GLC  --   --  85 124*     Most Recent 2 LFT's:Recent Labs   Lab Test 10/14/21  0434   *   *   ALKPHOS 77   BILITOTAL 0.9     Most Recent 3 INR's:Recent Labs   Lab Test 10/12/21  2248   INR 1.29*       No results for input(s): CHOL, HDL, LDL, TRIG, CHOLHDLRATIO in the last 11481 hours.  No results for input(s): LDL in the last 28436 hours.  Recent Labs   Lab Test 10/14/21  0641 10/14/21  0434 10/14/21  0434   NA  --   --  142   POTASSIUM 3.2*   < > 2.4*   CHLORIDE  --   --  99   CO2  --   --  34*   GLC  --   --  85   BUN  --   --  11   CR  --   --  0.70   GFRESTIMATED  --   --  >90   DELORES  --   --  8.5    < > = values in this interval not displayed.     No results for input(s): A1C in the last 46108 hours.  No results for input(s): HGB in the last 93484 hours.  Recent Labs   Lab Test 10/12/21  2248   TROPONINI 0.04     Recent Labs   Lab Test 10/12/21  2248   BNP 1,421*     Recent Labs   Lab Test 10/12/21  2248   TSH 1.21     Recent Labs   Lab Test 10/12/21  2248   INR 1.29*       Shanon Bedolla MD  [facility] Medicine [facility]  [phone]

## 2021-10-14 NOTE — CONSULTS
Care Management Initial Consult    General Information  Assessment completed with: Parents,  (Erik Suarez)  Type of CM/SW Visit: Initial Assessment    Primary Care Provider verified and updated as needed: Yes   Readmission within the last 30 days:      Reason for Consult: discharge planning  Advance Care Planning: Advance Care Planning Reviewed: no concerns identified     General Information Comments:  (Pt resides from home with 15 years old son and father & inde)    Communication Assessment  Patient's communication style: spoken language (English or Bilingual)    Hearing Difficulty or Deaf: no   Wear Glasses or Blind: no    Cognitive  Cognitive/Neuro/Behavioral: WDL                      Living Environment:   People in home: child(yohan), dependent, parent(s)   (Father Erik Ring and 15 years old son)  Current living Arrangements: house      Able to return to prior arrangements: yes  Living Arrangement Comments:  (Pt resides from home with 15 years old son & father & works )    Family/Social Support:  Care provided by: self  Provides care for: no one  Marital Status: Single  Parent(s)          Description of Support System: Supportive, Involved    Support Assessment: Adequate family and caregiver support, Adequate social supports    Current Resources:   Patient receiving home care services: No     Community Resources: None  Equipment currently used at home: none  Supplies currently used at home: None    Employment/Financial:  Employment Status: employed full-time        Financial Concerns:     Referral to Financial Counselor: No       Lifestyle & Psychosocial Needs:  Social Determinants of Health     Tobacco Use: Unknown     Smoking Tobacco Use: Never Smoker     Smokeless Tobacco Use: Unknown   Alcohol Use:      Frequency of Alcohol Consumption:      Average Number of Drinks:      Frequency of Binge Drinking:    Financial Resource Strain:      Difficulty of Paying Living Expenses:    Food Insecurity:      Worried  About Running Out of Food in the Last Year:      Ran Out of Food in the Last Year:    Transportation Needs:      Lack of Transportation (Medical):      Lack of Transportation (Non-Medical):    Physical Activity:      Days of Exercise per Week:      Minutes of Exercise per Session:    Stress:      Feeling of Stress :    Social Connections:      Frequency of Communication with Friends and Family:      Frequency of Social Gatherings with Friends and Family:      Attends Pentecostalism Services:      Active Member of Clubs or Organizations:      Attends Club or Organization Meetings:      Marital Status:    Intimate Partner Violence:      Fear of Current or Ex-Partner:      Emotionally Abused:      Physically Abused:      Sexually Abused:    Depression:      PHQ-2 Score:    Housing Stability:      Unable to Pay for Housing in the Last Year:      Number of Places Lived in the Last Year:      Unstable Housing in the Last Year:        Functional Status:  Prior to admission patient needed assistance:   Dependent ADLs:: Independent  Dependent IADLs:: Independent       Mental Health Status:  Mental Health Status: No Current Concerns       Chemical Dependency Status: none                Values/Beliefs:  Spiritual, Cultural Beliefs, Pentecostalism Practices, Values that affect care: no Caodaism              Additional Information:      RUSS FULLER  went patient room but she is not in her room. RUSS FULLER called patient father Alexander Suarez and introduce self, CM role & complete patient initial assessment and discuss her discharge goal. Pt father Alexander reports she resides from home with 15 years old son and patient father & works full time independent at baseline. Pt father reports she drives, she does not have home care health, she never uses any equipment before, she is very much  independent. Pt father reports her discharge  goal is to return home when she is medically to stable. Pt father alexander to transport her at discharge. Likely no needs CM  anticipate at discharge. CM to continue following.     TOBIAS Baez

## 2021-10-14 NOTE — PLAN OF CARE
Problem: Cardiac Output Decreased (Heart Failure)  Goal: Optimal Cardiac Output  Outcome: No Change       Output measured with hat, total 1200 ml measured so far this shift.  Patient kept NPO to comply with liver ultrasound instructions.    K was 2.4, critical lab reported to MD who ordered K protocol.  Per protocol 40 mEq of Potassium was ordered then MD contacted for additional instructions.      Problem: Sleep Disordered Breathing (Heart Failure)  Goal: Effective Breathing Pattern During Sleep  Outcome: No Change     Patient reported that breathing continued to improve after receiving lasix earlier, and she did not feel need for inhaler.  Patient reported swelling in legs was decreased, and RN noted it was currently +2 edema.

## 2021-10-14 NOTE — PROGRESS NOTES
HEART CARE NOTE          Assessment/Recommendations     1. HFrEF c/b ADHF  Assessment / Plan    Hypervolemic on physical exam, but diuresing well on current regimen - no changes at this time     Will get stress MRI with viability for further evaluation of her cardiomyopathy although high suspicion for longterm Adderall/amphetamine induced     GDMT as detailed below - patient denies possibility of pregnancy    Current Pharmacotherapy AHA Guideline-Directed Medical Therapy   Lisinopril 10 mg daily Lisinopril 20 mg twice daily   Metoprolol succinate 25 mg daily Carvedilol 25 mg twice daily   Spironolactone 25 mg Spironolactone 25 mg once daily   Hydralazine NA Hydralazine 100 mg three times daily   Isosorbide dinitrate NA Isosorbide dinitrate 40 mg three times daily   SGLT2 inhibitor: Not started Dapagliflozin or Empagliflozin 10 mg daily     2. ADD  Assessment / Plan    Currently on Adderall - patient reports longterm use/since childhood    3. EtOH abuse  Assessment / Plan    Counseled re:abstinace      4. HTN  Assessment / Plan    Titrate oral afterload reduction as tolerated     Clinically Significant Risk Factors Present on Admission           # Hypomagnesemia: Mg = 1.2 mg/dL (Ref range: 1.8 - 2.6 mg/dL) on admission, will replace as needed        Fluid & Electrolyte Disorders: Fluid overload, unspecified and Other fluid overload      History of Present Illness/Subjective    Ms. Tereza Suarez is a 41 year old female with a PMHx significant for history of hypertension, attention deficit disorder since childhood on chronic Adderall therapy, e-cigarette use, binge alcohol consumption, history of preeclampsia, first presented to urgency room with complaint of acute on chronic dyspnea and progressive fatigue.  D-dimer was found to be elevated and work-up included pulmonary CTA which was negative for pulmonary embolism but showed pulmonary edema.  She was then referred to [facility] ED where she has been boarding  until her transfer to [facility]    Today,  Reports significant improvement in her presenting symptoms of dyspnea and fluid retention, but continues to be volume overloaded    ECG: Personally reviewed. there are no previous tracings available for comparison, nonspecific ST and T waves changes, sinus tachycardia.    ECHO (personnaly Reviewed):     1. The left ventricle is normal in size. Left ventricular function is  decreased. The ejection fraction is 10-15% (severely reduced).There is severe  global hypokinesia of the left ventricle.  2. The right ventricle is normal size. Moderately decreased right ventricular  systolic function  3. The left atrium is mildly dilated. The right atrium is mildly dilated.  4. There is mild (1+) mitral regurgitation.  5. There is moderate (2+) tricuspid regurgitation. RA pressure of 8 mmHg. The  right ventricular systolic pressure is approximated at 25mmHg plus the right  atrial pressure.  6. Small pericardial effusion. There are no echocardiographic indications of  cardiac tamponade.          Physical Examination Review of Systems   BP (!) 146/81 (BP Location: Left arm)   Pulse 71   Temp 97.6  F (36.4  C) (Oral)   Resp 18   Wt 59.8 kg (131 lb 12.8 oz)   LMP 09/28/2021   SpO2 92%   BMI 28.52 kg/m    Body mass index is 28.52 kg/m .  Wt Readings from Last 3 Encounters:   10/13/21 59.8 kg (131 lb 12.8 oz)   10/13/21 59 kg (130 lb 1.1 oz)     General Appearance:   no distress, normal body habitus   ENT/Mouth: membranes moist, no oral lesions or bleeding gums.      EYES:  no scleral icterus, normal conjunctivae   Neck: no carotid bruits or thyromegaly   Chest/Lungs:   lungs are clear to auscultation, no rales or wheezing, equal chest wall expansion    Cardiovascular:   Regular. Normal first and second heart sounds with no murmurs, rubs, or gallops; the carotid, radial and posterior tibial pulses are intact, + JVD and 2-3 + LE edema bilaterally    Abdomen:  no organomegaly,  masses, bruits, or tenderness; bowel sounds are present   Extremities: no cyanosis or clubbing   Skin: no xanthelasma, warm.    Neurologic: normal gait, normal  bilateral, no tremors     Psychiatric: alert and oriented x3, calm     A complete 10 systems ROS was reviewed  And is negative except what is listed in the HPI.          Medical History  Surgical History Family History Social History   Past Medical History:   Diagnosis Date     Alcohol consumption binge drinking 10/13/2021     Electronic cigarette use 10/13/2021    No past surgical history on file. no family history of premature coronary artery disease Social History     Socioeconomic History     Marital status: Single     Spouse name: Not on file     Number of children: Not on file     Years of education: Not on file     Highest education level: Not on file   Occupational History     Not on file   Tobacco Use     Smoking status: Never Smoker   Substance and Sexual Activity     Alcohol use: Not on file     Drug use: Not on file     Sexual activity: Not on file   Other Topics Concern     Not on file   Social History Narrative     Not on file     Social Determinants of Health     Financial Resource Strain:      Difficulty of Paying Living Expenses:    Food Insecurity:      Worried About Running Out of Food in the Last Year:      Ran Out of Food in the Last Year:    Transportation Needs:      Lack of Transportation (Medical):      Lack of Transportation (Non-Medical):    Physical Activity:      Days of Exercise per Week:      Minutes of Exercise per Session:    Stress:      Feeling of Stress :    Social Connections:      Frequency of Communication with Friends and Family:      Frequency of Social Gatherings with Friends and Family:      Attends Ep[employer]opalian Services:      Active Member of Clubs or Organizations:      Attends Club or Organization Meetings:      Marital Status:    Intimate Partner Violence:      Fear of Current or Ex-Partner:      Emotionally  Abused:      Physically Abused:      Sexually Abused:            Lab Results    Chemistry/lipid CBC Cardiac Enzymes/BNP/TSH/INR   No results found for: CHOL, HDL, TRIG, CREATININE, BUN, NA, CO2 No results found for: WBC, HGB, HCT, MCV, PLT Lab Results   Component Value Date    TROPONINI 0.04 10/12/2021    BNP 1,421 (H) 10/12/2021    TSH 1.21 10/12/2021    INR 1.29 (H) 10/12/2021     Lab Results   Component Value Date    TROPONINI 0.04 10/12/2021          Weight:    Wt Readings from Last 3 Encounters:   10/13/21 59.8 kg (131 lb 12.8 oz)   10/13/21 59 kg (130 lb 1.1 oz)       Allergies  No Known Allergies      Surgical History  No past surgical history on file.    Social History  Tobacco:   History   Smoking Status     Never Smoker   Smokeless Tobacco     Not on file    Alcohol:   Social History    Substance and Sexual Activity      Alcohol use: Not on file   Illicit Drugs:   History   Drug Use Not on file       Family History  No family history on file.       Alexander Rhodes MD on 10/14/2021      cc: Clinic, [facility]

## 2021-10-14 NOTE — PROGRESS NOTES
Tele reading normal sinus with inverted t-waves and prolonged QT and QTc. VSS on room air. Pt still complaining of dyspnea on exertion but stated that it is improving. On 1500mL fluid restriction. Had an intake of 1080 mL and output of 4600 today. Ambulating independently. Writer provided pt with Heart Failure education folder. Pt has been asking a lot of questions and seems very open to education. NPO at 0800 for MRI stress test at 11am.

## 2021-10-14 NOTE — PLAN OF CARE
Problem: Oral Intake Inadequate (Heart Failure)  Goal: Optimal Nutrition Intake  Outcome: No Change   Patient was NPO for procedure. Did not return to unit until 1400. Informed patient that she is able to order food.    Problem: Fluid Imbalance (Heart Failure)  Goal: Fluid Balance  Outcome: Improving   Patient's edema is improving. Patient started on Lasix drip @ 10 mL/ hr when  returned from procedure area @ 1400.

## 2021-10-14 NOTE — PROGRESS NOTES
Spoke with Dr Amezcua pt received Metoprolol this AM. States he is reading the test and ok with proceeding at this time.  Lalitha Mesa RN

## 2021-10-15 LAB
ALBUMIN SERPL-MCNC: 3.4 G/DL (ref 3.5–5)
ALP SERPL-CCNC: 97 U/L (ref 45–120)
ALT SERPL W P-5'-P-CCNC: 332 U/L (ref 0–45)
ANION GAP SERPL CALCULATED.3IONS-SCNC: 11 MMOL/L (ref 5–18)
AST SERPL W P-5'-P-CCNC: 230 U/L (ref 0–40)
BILIRUB SERPL-MCNC: 0.8 MG/DL (ref 0–1)
BUN SERPL-MCNC: 10 MG/DL (ref 8–22)
CALCIUM SERPL-MCNC: 8.7 MG/DL (ref 8.5–10.5)
CHLORIDE BLD-SCNC: 98 MMOL/L (ref 98–107)
CO2 SERPL-SCNC: 34 MMOL/L (ref 22–31)
CREAT SERPL-MCNC: 0.77 MG/DL (ref 0.6–1.1)
ERYTHROCYTE [DISTWIDTH] IN BLOOD BY AUTOMATED COUNT: 13.6 % (ref 10–15)
GFR SERPL CREATININE-BSD FRML MDRD: >90 ML/MIN/1.73M2
GLUCOSE BLD-MCNC: 88 MG/DL (ref 70–125)
HCT VFR BLD AUTO: 45.7 % (ref 35–47)
HGB BLD-MCNC: 15.2 G/DL (ref 11.7–15.7)
MAGNESIUM SERPL-MCNC: 1.6 MG/DL (ref 1.8–2.6)
MAGNESIUM SERPL-MCNC: 2 MG/DL (ref 1.8–2.6)
MCH RBC QN AUTO: 31 PG (ref 26.5–33)
MCHC RBC AUTO-ENTMCNC: 33.3 G/DL (ref 31.5–36.5)
MCV RBC AUTO: 93 FL (ref 78–100)
PLATELET # BLD AUTO: 342 10E3/UL (ref 150–450)
POTASSIUM BLD-SCNC: 2.8 MMOL/L (ref 3.5–5)
POTASSIUM BLD-SCNC: 4.6 MMOL/L (ref 3.5–5)
PROT SERPL-MCNC: 6.7 G/DL (ref 6–8)
RBC # BLD AUTO: 4.9 10E6/UL (ref 3.8–5.2)
SODIUM SERPL-SCNC: 143 MMOL/L (ref 136–145)
WBC # BLD AUTO: 8.2 10E3/UL (ref 4–11)

## 2021-10-15 PROCEDURE — 250N000013 HC RX MED GY IP 250 OP 250 PS 637: Performed by: INTERNAL MEDICINE

## 2021-10-15 PROCEDURE — 250N000011 HC RX IP 250 OP 636: Performed by: INTERNAL MEDICINE

## 2021-10-15 PROCEDURE — 99233 SBSQ HOSP IP/OBS HIGH 50: CPT | Performed by: INTERNAL MEDICINE

## 2021-10-15 PROCEDURE — 85027 COMPLETE CBC AUTOMATED: CPT | Performed by: INTERNAL MEDICINE

## 2021-10-15 PROCEDURE — 99232 SBSQ HOSP IP/OBS MODERATE 35: CPT | Performed by: INTERNAL MEDICINE

## 2021-10-15 PROCEDURE — 83735 ASSAY OF MAGNESIUM: CPT | Performed by: INTERNAL MEDICINE

## 2021-10-15 PROCEDURE — 36415 COLL VENOUS BLD VENIPUNCTURE: CPT | Performed by: INTERNAL MEDICINE

## 2021-10-15 PROCEDURE — 250N000013 HC RX MED GY IP 250 OP 250 PS 637: Performed by: FAMILY MEDICINE

## 2021-10-15 PROCEDURE — 82040 ASSAY OF SERUM ALBUMIN: CPT | Performed by: INTERNAL MEDICINE

## 2021-10-15 PROCEDURE — 258N000003 HC RX IP 258 OP 636: Performed by: INTERNAL MEDICINE

## 2021-10-15 PROCEDURE — 210N000001 HC R&B IMCU HEART CARE

## 2021-10-15 PROCEDURE — 84132 ASSAY OF SERUM POTASSIUM: CPT | Performed by: INTERNAL MEDICINE

## 2021-10-15 RX ORDER — POTASSIUM CHLORIDE 7.45 MG/ML
10 INJECTION INTRAVENOUS ONCE
Status: DISCONTINUED | OUTPATIENT
Start: 2021-10-15 | End: 2021-10-16 | Stop reason: HOSPADM

## 2021-10-15 RX ORDER — POTASSIUM CHLORIDE 1500 MG/1
40 TABLET, EXTENDED RELEASE ORAL ONCE
Status: COMPLETED | OUTPATIENT
Start: 2021-10-15 | End: 2021-10-15

## 2021-10-15 RX ORDER — POTASSIUM CHLORIDE 7.45 MG/ML
10 INJECTION INTRAVENOUS ONCE
Status: COMPLETED | OUTPATIENT
Start: 2021-10-15 | End: 2021-10-15

## 2021-10-15 RX ORDER — BUMETANIDE 1 MG/1
4 TABLET ORAL DAILY
Status: DISCONTINUED | OUTPATIENT
Start: 2021-10-15 | End: 2021-10-16 | Stop reason: HOSPADM

## 2021-10-15 RX ORDER — MAGNESIUM SULFATE 4 G/50ML
4 INJECTION INTRAVENOUS ONCE
Status: COMPLETED | OUTPATIENT
Start: 2021-10-15 | End: 2021-10-15

## 2021-10-15 RX ORDER — LANOLIN ALCOHOL/MO/W.PET/CERES
9 CREAM (GRAM) TOPICAL
Status: DISCONTINUED | OUTPATIENT
Start: 2021-10-15 | End: 2021-10-16 | Stop reason: HOSPADM

## 2021-10-15 RX ORDER — POTASSIUM CHLORIDE 7.45 MG/ML
10 INJECTION INTRAVENOUS
Status: DISCONTINUED | OUTPATIENT
Start: 2021-10-15 | End: 2021-10-15

## 2021-10-15 RX ADMIN — SERTRALINE HYDROCHLORIDE 50 MG: 50 TABLET ORAL at 08:20

## 2021-10-15 RX ADMIN — DOXYLAMINE SUCCINATE 25 MG: 25 TABLET ORAL at 20:50

## 2021-10-15 RX ADMIN — FAMOTIDINE 20 MG: 20 TABLET, FILM COATED ORAL at 20:50

## 2021-10-15 RX ADMIN — BUMETANIDE 4 MG: 1 TABLET ORAL at 11:38

## 2021-10-15 RX ADMIN — POTASSIUM CHLORIDE 40 MEQ: 20 TABLET, EXTENDED RELEASE ORAL at 06:17

## 2021-10-15 RX ADMIN — MONTELUKAST 10 MG: 10 TABLET, FILM COATED ORAL at 20:50

## 2021-10-15 RX ADMIN — FUROSEMIDE 10 MG/HR: 10 INJECTION, SOLUTION INTRAVENOUS at 09:18

## 2021-10-15 RX ADMIN — POTASSIUM CHLORIDE 10 MEQ: 7.46 INJECTION, SOLUTION INTRAVENOUS at 06:28

## 2021-10-15 RX ADMIN — FAMOTIDINE 20 MG: 20 TABLET, FILM COATED ORAL at 08:20

## 2021-10-15 RX ADMIN — POTASSIUM CHLORIDE 40 MEQ: 20 TABLET, EXTENDED RELEASE ORAL at 06:16

## 2021-10-15 RX ADMIN — LISINOPRIL 10 MG: 5 TABLET ORAL at 08:20

## 2021-10-15 RX ADMIN — SPIRONOLACTONE 25 MG: 25 TABLET, FILM COATED ORAL at 08:21

## 2021-10-15 RX ADMIN — MELATONIN TAB 3 MG 3 MG: 3 TAB at 01:17

## 2021-10-15 RX ADMIN — POTASSIUM CHLORIDE 40 MEQ: 20 TABLET, EXTENDED RELEASE ORAL at 10:17

## 2021-10-15 RX ADMIN — NORTRIPTYLINE HYDROCHLORIDE 60 MG: 50 CAPSULE ORAL at 20:50

## 2021-10-15 RX ADMIN — MAGNESIUM SULFATE HEPTAHYDRATE 4 G: 80 INJECTION, SOLUTION INTRAVENOUS at 06:16

## 2021-10-15 RX ADMIN — METOPROLOL SUCCINATE 25 MG: 25 TABLET, EXTENDED RELEASE ORAL at 08:21

## 2021-10-15 NOTE — PROGRESS NOTES
HEART CARE NOTE          Assessment/Recommendations     1. HFrEF c/b ADHF  Assessment / Plan    Near euvolemia on physical exam --> will transition to oral diuretic regimen today     S/p stress MRI with viability which was negative for ischemia, not likely to be myocarditis. Findings were most consistent with stress induced cardiomyopathy, rheumatoid arthritis, systemic sclerosis; EF showed significant improvement to ~ 30% in keeping with a stress induced CM; will repeat echo; will also discuss alternative to ongoing Adderall/amphetamine for her ADD as she will need to hold Adderall    Anticipate discharge tomorrow    GDMT as detailed below      Current Pharmacotherapy AHA Guideline-Directed Medical Therapy   Lisinopril 10 mg daily Lisinopril 20 mg twice daily   Metoprolol succinate 25 mg daily Carvedilol 25 mg twice daily   Spironolactone 25 mg Spironolactone 25 mg once daily   Hydralazine NA Hydralazine 100 mg three times daily   Isosorbide dinitrate NA Isosorbide dinitrate 40 mg three times daily   SGLT2 inhibitor: Not started Dapagliflozin or Empagliflozin 10 mg daily      2. ADD  Assessment / Plan    Currently on Adderall - patient reports longterm use/since childhood; will discuss alternatives given new CM     3. EtOH abuse  Assessment / Plan    Counseled re:abstinace       4. HTN  Assessment / Plan    Titrate oral afterload reduction as tolerated       History of Present Illness/Subjective      Ms. Tereza Suarez is a 41 year old female with a PMHx significant for history of hypertension, attention deficit disorder since childhood on chronic Adderall therapy, e-cigarette use, binge alcohol consumption, history of preeclampsia, first presented to urgency room with complaint of acute on chronic dyspnea and progressive fatigue.  D-dimer was found to be elevated and work-up included pulmonary CTA which was negative for pulmonary embolism but showed pulmonary edema.  She was then referred to [facility] ED where  she has been boarding until her transfer to [facility].     Today, Mrs. Reports resolution of presenting symptoms. Denies HF symptoms of orthopnea, PND or fluid retention/edema     ECG: Personally reviewed. there are no previous tracings available for comparison, nonspecific ST and T waves changes, sinus tachycardia.     Stress MRI:  1.  Pharmacological Regadenoson stress cardiac MRI is negative for inducible myocardial ischemia.   2.  Pharmacological stress ECG is negative for inducible myocardial ischemia.   3.  Normal left ventricular size and volume with severe reduction in systolic function. The quantified left  ventricular ejection fraction is 31.9%.  There is global hypokinesis.  No focal myocardial scar identified  on LGE images.  ECV (Extracellular volume) is abnormal at 36% and pre contrast T1 mapping (septal) is  abnormal at 1127 ms consistent with diffuse interstitial myocardial fibrosis.  These finds can be seen in  stress induced cardiomyopathy, rheumatoid arthritis, systemic sclerosis and less likely acute myocarditis  given normal LGE.        4.  Normal right ventricular size with moderate reduction in systolic function.    5.  No significant valvular abnormalities.  6.  There is a moderate circumferential pericardial effusion. Pericardium thickness is normal. No  enhancement of pericardium.     ECHO (personnaly Reviewed):     1. The left ventricle is normal in size. Left ventricular function is  decreased. The ejection fraction is 10-15% (severely reduced).There is severe  global hypokinesia of the left ventricle.  2. The right ventricle is normal size. Moderately decreased right ventricular  systolic function  3. The left atrium is mildly dilated. The right atrium is mildly dilated.  4. There is mild (1+) mitral regurgitation.  5. There is moderate (2+) tricuspid regurgitation. RA pressure of 8 mmHg. The  right ventricular systolic pressure is approximated at 25mmHg plus the right  atrial  pressure.  6. Small pericardial effusion. There are no echocardiographic indications of  cardiac tamponade.          Physical Examination Review of Systems   /72   Pulse 85   Temp 98.3  F (36.8  C)   Resp 18   Wt 54.8 kg (120 lb 12.8 oz)   LMP 09/28/2021   SpO2 98%   BMI 26.14 kg/m    Body mass index is 26.14 kg/m .  Wt Readings from Last 3 Encounters:   10/14/21 54.8 kg (120 lb 12.8 oz)   10/13/21 59 kg (130 lb 1.1 oz)     General Appearance:   no distress, normal body habitus   ENT/Mouth: membranes moist, no oral lesions or bleeding gums.      EYES:  no scleral icterus, normal conjunctivae   Neck: no carotid bruits or thyromegaly   Chest/Lungs:   lungs are clear to auscultation, no rales or wheezing, equal chest wall expansion    Cardiovascular:   Regular. Normal first and second heart sounds with no murmurs, rubs, or gallops; the carotid, radial and posterior tibial pulses are intact, no JVD or LE edema bilaterally    Abdomen:  no organomegaly, masses, bruits, or tenderness; bowel sounds are present   Extremities: no cyanosis or clubbing   Skin: no xanthelasma, warm.    Neurologic: alert and oriented x3, calm     Psychiatric: alert and oriented x3, calm     A complete 10 systems ROS was reviewed  And is negative except what is listed in the HPI.          Medical History  Surgical History Family History Social History   Past Medical History:   Diagnosis Date     Alcohol consumption binge drinking 10/13/2021     Electronic cigarette use 10/13/2021    No past surgical history on file. no family history of premature coronary artery disease Social History     Socioeconomic History     Marital status: Single     Spouse name: Not on file     Number of children: Not on file     Years of education: Not on file     Highest education level: Not on file   Occupational History     Not on file   Tobacco Use     Smoking status: Never Smoker   Substance and Sexual Activity     Alcohol use: Not on file     Drug use:  Not on file     Sexual activity: Not on file   Other Topics Concern     Not on file   Social History Narrative     Not on file     Social Determinants of Health     Financial Resource Strain:      Difficulty of Paying Living Expenses:    Food Insecurity:      Worried About Running Out of Food in the Last Year:      Ran Out of Food in the Last Year:    Transportation Needs:      Lack of Transportation (Medical):      Lack of Transportation (Non-Medical):    Physical Activity:      Days of Exercise per Week:      Minutes of Exercise per Session:    Stress:      Feeling of Stress :    Social Connections:      Frequency of Communication with Friends and Family:      Frequency of Social Gatherings with Friends and Family:      Attends Christianity Services:      Active Member of Clubs or Organizations:      Attends Club or Organization Meetings:      Marital Status:    Intimate Partner Violence:      Fear of Current or Ex-Partner:      Emotionally Abused:      Physically Abused:      Sexually Abused:            Lab Results    Chemistry/lipid CBC Cardiac Enzymes/BNP/TSH/INR   Lab Results   Component Value Date    BUN 10 10/15/2021     10/15/2021    CO2 34 (H) 10/15/2021    Lab Results   Component Value Date    WBC 8.2 10/15/2021    HGB 15.2 10/15/2021    HCT 45.7 10/15/2021    MCV 93 10/15/2021     10/15/2021    Lab Results   Component Value Date    TROPONINI 0.04 10/12/2021    BNP 1,421 (H) 10/12/2021    TSH 1.21 10/12/2021    INR 1.29 (H) 10/12/2021     Lab Results   Component Value Date    TROPONINI 0.04 10/12/2021          Weight:    Wt Readings from Last 3 Encounters:   10/14/21 54.8 kg (120 lb 12.8 oz)   10/13/21 59 kg (130 lb 1.1 oz)       Allergies  No Known Allergies      Surgical History  No past surgical history on file.    Social History  Tobacco:   History   Smoking Status     Never Smoker   Smokeless Tobacco     Not on file    Alcohol:   Social History    Substance and Sexual Activity      Alcohol  use: Not on file   Illicit Drugs:   History   Drug Use Not on file       Family History  No family history on file.       Alexander Rhodes MD on 10/15/2021      cc: [facility]

## 2021-10-15 NOTE — PROGRESS NOTES
Care Plan Progress Note:    Name: Tereza Suarez  :   1980  MRN:   [id number]    Pt denied pain. VSS on room air. Pt seemed to be in much better spirits today. Tele reading normal sinus with prolonged QT and QTc. Furosemide drip running at 10 mg/hr. On 1500 mL fluid restriction. Had an intake of 1542 mL and an output of 2400 mL today. Pt reported that she had two unmeasured urine occurrences on day shift d/t hat being full. Pt reports having a dry mouth, wondering if this is d/t a medication. Pt is also wondering if she can discontinue asthma medication and writer encouraged her to talk with MD about this tomorrow. Ambulating independently. Edema improved today. On K+ protocol. K replaced x1, recheck entered tomorrow morning.       /61 (BP Location: Left arm)   Pulse 80   Temp 98.1  F (36.7  C) (Oral)   Resp 18   Wt 54.8 kg (120 lb 12.8 oz)   LMP 2021   SpO2 97%   BMI 26.14 kg/m        10/14/2021      Maite Mooney, RN      Addendum 0548: Potassium came back critical at 2.8. Notified hospitalist. Ordered replacement of 40 mEq x1, recheck 4 hours after. Magnesium protocol ordered, lab still needs to come and draw so protocol can be ran.    Pt was having difficulty falling asleep. 0400 vitals not done because pt had just fallen asleep. Weight also still needs to be done.

## 2021-10-15 NOTE — PROGRESS NOTES
[facility] was introduced to the patient today including our role in the home management of their heart failure.  Heart Failure Education Materials were shared today with the patient.  Introduction to the expectations including daily weight tracking using the Daily Weight Log  Home B/P monitoring as appropriate( to bring in home monitoring cuff to the clinic appointment for verification)  Low Sodium diet of 2000mg or less daily, review of label reading, aim for fresh and avoid processed items  Daily Fluid restriction of 2000ml  considerations  Exercise importance as able explained  Monitor and report s/s of heart failure. How to report these changes.  Follow up appointments and testing expectations.  Tereza lives with her son, and her father. She often will eat out at [employer], [employer], or [employer] in the past. She was advised of the importance of selecting lower sodium diet choices to prevent inpatient stays and more strain on her kidneys. She verbalized understanding of this information.  Offered information on the [employer]al Plan that may be a feasible option in the future for her to eliminate the guess work on how to make low sodium selections.  She was advised of the 5-7 day f/u appointment that will be on her discharge paperwork.    Arlin Mullen RN BSN, CHFN      [facility]   [phone] Nurse [facility]   Heart [facility]   [address] [place] [address]

## 2021-10-15 NOTE — PROGRESS NOTES
[facility]    PROGRESS NOTE - Hospitalist Service    Assessment and Plan    Principal Problem:    Acute systolic congestive heart failure (H)  Active Problems:    Pericardial effusion    Acute pulmonary edema (H)    Elevated liver function tests    Pleural effusion, bilateral    Hypertensive urgency    Attention deficit hyperactivity disorder (ADHD)    Hypokalemia    Hypomagnesemia    Tereza Suarez is a 41 year old old female with h/o hypertension, attention deficit disorder since childhood on chronic Adderall therapy, e-cigarette use, binge alcohol consumption, history of preeclampsia, first presented to urgency room with complaint of acute on chronic dyspnea and progressive fatigue.  D-dimer was found to be elevated and work-up included pulmonary CTA which was negative for pulmonary embolism but showed pulmonary edema.  She was then referred to [facility] ED where she has been boarding until her transfer to [facility].     Patient recalls shortness of breath starting approximately 11 months ago last November. When persisted, she visited her primary care doctor who considered this might be reactive airway disease and prescribed an inhaler.     Patient reports for the last 2-3 weeks she has had increasing lower extremity swelling, progressive fatigue and breathlessness.  She is fully vaccinated against COVID-19  She lives at home with her father and son who is 15 years old    Acute systolic congestive heart failure:  Severe cardiomyopathy:  - Echocardiogram shows EF 10 to 15% with severe global hypokinesia  - CT pulmonary angiogram negative for PE  - Cardiology following started on IV lasix drip and switched over to po Bumex 4mg daily   - full cardiac work-up including cardiac MRI findings negative for ischemia suggestive of stress induced cardiomyopathy   - spironolactone and metoprolol   - holding adderall for now and would hold for at least 1-2 months until repeat echo.      Hypertension: Longstanding and untreated. dates back to her pregnancy 15 years ago.  - medications as above     Elevated liver enzymes: Most likely related to passive liver congestion.    - normal US of liver  - trend labs     Macrocytosis:  - B12 normal   - does use alcohol     Hypomag and hypokalemia   - potassium replacement protocol 4.6   - 4gm IV mag today   - recheck in am     Tobacco use: Patient does use e-cigarettes on a regular basis she is counseled not to pursue this anymore  Overweight w BMI 25-29.9: Body mass index is 28.52 kg/m .     VTE prophylaxis:  Pneumatic Compression Devices  DIET: Orders Placed This Encounter      2 Gram Sodium Diet    Drains/Lines: none  Weight bearing status: WBAT  Disposition/Barriers to discharge: possible discharge tomorrow   Code Status: Full Code    Subjective:  Leg welling decreased and breathing improved     PHYSICAL EXAM  Temp:  [97.8  F (36.6  C)-98.3  F (36.8  C)] 98.3  F (36.8  C)  Pulse:  [66-85] 81  Resp:  [18] 18  BP: (106-128)/(60-85) 116/72  SpO2:  [91 %-98 %] 98 %  Wt Readings from Last 1 Encounters:   10/14/21 54.8 kg (120 lb 12.8 oz)       Intake/Output Summary (Last 24 hours) at 10/14/2021 0817  Last data filed at 10/14/2021 0400  Gross per 24 hour   Intake 123 ml   Output 2000 ml   Net -1877 ml      Body mass index is 26.14 kg/m .  Physical Exam  Constitutional:       Appearance: Normal appearance.   HENT:      Head: Normocephalic and atraumatic.   Cardiovascular:      Rate and Rhythm: Normal rate and regular rhythm.      Pulses: Normal pulses.      Heart sounds: Normal heart sounds.   Pulmonary:      Effort: Pulmonary effort is normal.      Breath sounds: Normal breath sounds.   Abdominal:      General: Bowel sounds are normal.      Palpations: Abdomen is soft.   Musculoskeletal:      Comments: 1+ BLE edema significantly decreased from previous.    Skin:     General: Skin is warm and dry.   Neurological:      General: No focal deficit present.       Mental Status: She is alert and oriented to person, place, and time. Mental status is at baseline.         PERTINENT LABS/IMAGING:  No results found for this visit on 10/13/21.  Most Recent 3 CBC's:  Recent Labs   Lab Test 10/15/21  0435   WBC 8.2   HGB 15.2   MCV 93        Most Recent 3 BMP's:  Recent Labs   Lab Test 10/15/21  0435 10/14/21  1412 10/14/21  0641 10/14/21  0434 10/14/21  0434 10/13/21  2047     --   --   --  142  --    POTASSIUM 2.8* 3.6 3.2*   < > 2.4*  --    CHLORIDE 98  --   --   --  99  --    CO2 34*  --   --   --  34*  --    BUN 10  --   --   --  11  --    CR 0.77  --   --   --  0.70  --    ANIONGAP 11  --   --   --  9  --    DELORES 8.7  --   --   --  8.5  --    GLC 88  --   --   --  85 124*    < > = values in this interval not displayed.     Most Recent 2 LFT's:  Recent Labs   Lab Test 10/15/21  0435 10/14/21  0434   * 266*   * 278*   ALKPHOS 97 77   BILITOTAL 0.8 0.9     Most Recent 3 INR's:  Recent Labs   Lab Test 10/12/21  2248   INR 1.29*       No results for input(s): CHOL, HDL, LDL, TRIG, CHOLHDLRATIO in the last 32577 hours.  No results for input(s): LDL in the last 52821 hours.  Recent Labs   Lab Test 10/14/21  0641 10/14/21  0434 10/14/21  0434   NA  --   --  142   POTASSIUM 3.2*   < > 2.4*   CHLORIDE  --   --  99   CO2  --   --  34*   GLC  --   --  85   BUN  --   --  11   CR  --   --  0.70   GFRESTIMATED  --   --  >90   DELORES  --   --  8.5    < > = values in this interval not displayed.     No results for input(s): A1C in the last 80752 hours.  No results for input(s): HGB in the last 77307 hours.  Recent Labs   Lab Test 10/12/21  2248   TROPONINI 0.04     Recent Labs   Lab Test 10/12/21  2248   BNP 1,421*     Recent Labs   Lab Test 10/12/21  2248   TSH 1.21     Recent Labs   Lab Test 10/12/21  2248   INR 1.29*       Shanon Bedolla MD  [facility] Medicine Service  [phone]

## 2021-10-15 NOTE — PLAN OF CARE
Problem: Adult Inpatient Plan of Care  Goal: Plan of Care Review  Outcome: Improving  Flowsheets (Taken 10/15/2021 1101)  Plan of Care Reviewed With: patient  Goal: Patient-Specific Goal (Individualized)  Outcome: Improving  Goal: Absence of Hospital-Acquired Illness or Injury  Outcome: Improving  Intervention: Identify and Manage Fall Risk  Recent Flowsheet Documentation  Taken 10/15/2021 0800 by Sadie Rm RN  Safety Promotion/Fall Prevention: assistive device/personal items within reach  Goal: Optimal Comfort and Wellbeing  Outcome: Improving  Goal: Readiness for Transition of Care  Outcome: Improving     Problem: Adjustment to Illness (Heart Failure)  Goal: Optimal Coping  Outcome: Improving     Problem: Cardiac Output Decreased (Heart Failure)  Goal: Optimal Cardiac Output  Outcome: Improving     Problem: Fluid Imbalance (Heart Failure)  Goal: Fluid Balance  Outcome: Improving     Problem: Functional Ability Impaired (Heart Failure)  Goal: Optimal Functional Ability  Outcome: Improving     Problem: Oral Intake Inadequate (Heart Failure)  Goal: Optimal Nutrition Intake  Outcome: Improving   Pt is alert and oriented x4. Pt is med complaint. Pt denies pain. Pt's K level was low this morning and received po K. Pt refused Iv k due to burning sensation on her arm. Cardiologist is aware. Pharmacist is aware. Pt received extra dose of po K per order.  Continue to monitor pt.

## 2021-10-16 VITALS
RESPIRATION RATE: 18 BRPM | SYSTOLIC BLOOD PRESSURE: 132 MMHG | BODY MASS INDEX: 24.32 KG/M2 | TEMPERATURE: 98.5 F | WEIGHT: 112.4 LBS | OXYGEN SATURATION: 98 % | HEART RATE: 93 BPM | DIASTOLIC BLOOD PRESSURE: 64 MMHG

## 2021-10-16 LAB
ANION GAP SERPL CALCULATED.3IONS-SCNC: 14 MMOL/L (ref 5–18)
BUN SERPL-MCNC: 12 MG/DL (ref 8–22)
CALCIUM SERPL-MCNC: 9.8 MG/DL (ref 8.5–10.5)
CHLORIDE BLD-SCNC: 100 MMOL/L (ref 98–107)
CO2 SERPL-SCNC: 23 MMOL/L (ref 22–31)
CREAT SERPL-MCNC: 0.95 MG/DL (ref 0.6–1.1)
GFR SERPL CREATININE-BSD FRML MDRD: 76 ML/MIN/1.73M2
GLUCOSE BLD-MCNC: 104 MG/DL (ref 70–125)
MAGNESIUM SERPL-MCNC: 2.2 MG/DL (ref 1.8–2.6)
POTASSIUM BLD-SCNC: 4.6 MMOL/L (ref 3.5–5)
SODIUM SERPL-SCNC: 137 MMOL/L (ref 136–145)

## 2021-10-16 PROCEDURE — 99239 HOSP IP/OBS DSCHRG MGMT >30: CPT | Performed by: INTERNAL MEDICINE

## 2021-10-16 PROCEDURE — 250N000013 HC RX MED GY IP 250 OP 250 PS 637: Performed by: FAMILY MEDICINE

## 2021-10-16 PROCEDURE — 99232 SBSQ HOSP IP/OBS MODERATE 35: CPT | Performed by: INTERNAL MEDICINE

## 2021-10-16 PROCEDURE — 83735 ASSAY OF MAGNESIUM: CPT | Performed by: INTERNAL MEDICINE

## 2021-10-16 PROCEDURE — 250N000013 HC RX MED GY IP 250 OP 250 PS 637: Performed by: INTERNAL MEDICINE

## 2021-10-16 PROCEDURE — 36415 COLL VENOUS BLD VENIPUNCTURE: CPT | Performed by: INTERNAL MEDICINE

## 2021-10-16 PROCEDURE — 80048 BASIC METABOLIC PNL TOTAL CA: CPT | Performed by: INTERNAL MEDICINE

## 2021-10-16 RX ORDER — LISINOPRIL 10 MG/1
10 TABLET ORAL DAILY
Qty: 30 TABLET | Refills: 0 | Status: SHIPPED | OUTPATIENT
Start: 2021-10-17 | End: 2021-11-16

## 2021-10-16 RX ORDER — METOPROLOL SUCCINATE 25 MG/1
25 TABLET, EXTENDED RELEASE ORAL DAILY
Qty: 30 TABLET | Refills: 0 | Status: SHIPPED | OUTPATIENT
Start: 2021-10-17 | End: 2021-11-16

## 2021-10-16 RX ORDER — BUMETANIDE 2 MG/1
4 TABLET ORAL DAILY
Qty: 60 TABLET | Refills: 0 | Status: SHIPPED | OUTPATIENT
Start: 2021-10-17 | End: 2021-11-16

## 2021-10-16 RX ORDER — SPIRONOLACTONE 25 MG/1
25 TABLET ORAL DAILY
Qty: 30 TABLET | Refills: 0 | Status: SHIPPED | OUTPATIENT
Start: 2021-10-17 | End: 2021-11-16

## 2021-10-16 RX ADMIN — LISINOPRIL 10 MG: 5 TABLET ORAL at 09:26

## 2021-10-16 RX ADMIN — SERTRALINE HYDROCHLORIDE 50 MG: 50 TABLET ORAL at 09:26

## 2021-10-16 RX ADMIN — METOPROLOL SUCCINATE 25 MG: 25 TABLET, EXTENDED RELEASE ORAL at 09:31

## 2021-10-16 RX ADMIN — BUMETANIDE 4 MG: 1 TABLET ORAL at 09:25

## 2021-10-16 RX ADMIN — SPIRONOLACTONE 25 MG: 25 TABLET, FILM COATED ORAL at 09:25

## 2021-10-16 RX ADMIN — FAMOTIDINE 20 MG: 20 TABLET, FILM COATED ORAL at 09:25

## 2021-10-16 NOTE — PLAN OF CARE
Problem: Oral Intake Inadequate (Heart Failure)  Goal: Optimal Nutrition Intake  Outcome: Adequate for Discharge     Problem: Functional Ability Impaired (Heart Failure)  Goal: Optimal Functional Ability  Outcome: Adequate for Discharge  Intervention: Optimize Functional Ability  Recent Flowsheet Documentation  Taken 10/16/2021 0900 by Isaac Dial, RN  Activity Management:    activity encouraged    up ad yahir     Problem: Dysrhythmia (Heart Failure)  Goal: Stable Heart Rate and Rhythm  Outcome: Adequate for Discharge   Pt heart rhythm was normal sinus with 1st degree AV block. Pt encouraged to monitor her fluid intake at home. No pain noted. No edema noted on bilateral lower extremities. Pt to discharge home via ride in personal vehicle driven by friend. Pt declined flu shot because she stated that she would get a $5 coupon if she got it at work. No other concerns noted.  Isaac Dial RN  10/16/2021  11:04 AM     Initial (On Arrival)

## 2021-10-16 NOTE — PLAN OF CARE
Problem: Dysrhythmia (Heart Failure)  Goal: Stable Heart Rate and Rhythm  10/16/2021 0452 by Farideh Alvarez RN  Outcome: Improving  Problem: Oral Intake Inadequate (Heart Failure)  Goal: Optimal Nutrition Intake  Outcome: Improving     Problem: Respiratory Compromise (Heart Failure)  Goal: Effective Oxygenation and Ventilation  Outcome: Improving   Pt c/o abdominal pain and loose stools, moderate amount  around 0100 H. Offered tea and crackers, pt felt better and went to sleep. She reported around 0300 H that she had 2 watery stools again,moderate amount . Her stomach felt better after the bowel movement and went back to sleep. Will continue to monitor.

## 2021-10-16 NOTE — PLAN OF CARE
Problem: Adjustment to Illness (Heart Failure)  Goal: Optimal Coping  Outcome: Improving     Problem: Cardiac Output Decreased (Heart Failure)  Goal: Optimal Cardiac Output  Outcome: Improving     Problem: Dysrhythmia (Heart Failure)  Goal: Stable Heart Rate and Rhythm  Outcome: Improving     Problem: Functional Ability Impaired (Heart Failure)  Goal: Optimal Functional Ability  Outcome: Improving   Pt is alert and oriented x 4, denies pain or SOB, lung sound clear, on RA. Pt verbalized feeling better today and feels she is ready to discharge tomorrow. HR in the 80's, NSR with inverted TW. She is independent in the room. Voiding well, no edema noted in the BLE.Will continue to monitor.

## 2021-10-16 NOTE — DISCHARGE SUMMARY
[facility]  Hospitalist Discharge Summary      Date of Admission:  10/13/2021  Date of Discharge:  10/16/2021  Discharging Provider: Shanon Bedolla MD      Discharge Diagnoses   Acute Systolic Heart failure     Follow-ups Needed After Discharge   Follow-up Appointments     Add follow up information to the AVS prior to printing          Unresulted Labs Ordered in the Past 30 Days of this Admission     No orders found from 9/13/2021 to 10/14/2021.          Discharge Disposition   Discharged to home  Condition at discharge: Stable      Hospital Course    Acute systolic congestive heart failure:  Severe cardiomyopathy:  - Echocardiogram shows EF 10 to 15% with severe global hypokinesia  - CT pulmonary angiogram negative for PE  - Cardiology following started on IV lasix drip and switched over to po Bumex 4mg daily   - full cardiac work-up including cardiac MRI findings negative for ischemia suggestive of stress induced cardiomyopathy   - spironolactone and metoprolol   - holding adderall for now and likely indefinitely with her EF  - cardiology arranging CHF follow-up   - CHF orders in place       Hypertension: Longstanding and untreated. dates back to her pregnancy 15 years ago.  - medications as above      Elevated liver enzymes: Most likely related to passive liver congestion.    - normal US of liver  - trend labs, recheck at follow uo      Macrocytosis:  - B12 normal   - does use alcohol      Hypomag and hypokalemia   - potassium replacement protocol 4.6   - 4gm IV mag today   Stable    Tobacco use: Patient does use e-cigarettes on a regular basis she is counseled     ADHD  - stop adderall  - referral to Mental health for ADHD testing and further medication other then amphetamine due to her decreased heart function, Cardiology and [facility] [place] in stopping and may be the cause for her CHF  - [facility] and discussed with team and agree with stopping adderall and recommended the  Mental health referral and testing, suggested possibilities in the future Strattera or Wellbutrin as examples. But would hold for a few months until cardiac function improved.     Overweight w BMI 25-29.9: Body mass index is 28.52 kg/m .     Consultations This Hospital Stay   CARDIOLOGY IP CONSULT  SOCIAL WORK IP CONSULT    Code Status   Full Code    Time Spent on this Encounter   I, Shanon Bedolla MD, personally saw the patient today and spent greater than 30 minutes discharging this patient.       Shanon Bedolla MD  [facility]  [address] [place] [id number]  Phone: [phone]  Fax: [phone]  ______________________________________________________________________    Physical Exam   Vital Signs: Temp: 98.5  F (36.9  C) Temp src: Oral BP: 132/64 Pulse: 93   Resp: 18 SpO2: 98 % O2 Device: None (Room air)    Weight: 112 lbs 6.4 oz  Physical Exam  Constitutional:       General: She is not in acute distress.     Appearance: Normal appearance. She is not ill-appearing.   HENT:      Head: Normocephalic and atraumatic.      Nose: Nose normal.      Mouth/Throat:      Mouth: Mucous membranes are moist.      Pharynx: Oropharynx is clear.   Eyes:      Extraocular Movements: Extraocular movements intact.      Conjunctiva/sclera: Conjunctivae normal.   Cardiovascular:      Rate and Rhythm: Normal rate and regular rhythm.      Pulses: Normal pulses.      Heart sounds: Normal heart sounds. No murmur heard.   No friction rub. No gallop.    Pulmonary:      Effort: Pulmonary effort is normal.      Breath sounds: Normal breath sounds. No wheezing, rhonchi or rales.   Abdominal:      General: Bowel sounds are normal.      Palpations: Abdomen is soft.   Musculoskeletal:      Comments: BLE leg edema resolved,  no C/C   Skin:     General: Skin is warm and dry.      Capillary Refill: Capillary refill takes less than 2 seconds.   Neurological:      General: No focal deficit  present.      Mental Status: She is alert and oriented to person, place, and time. Mental status is at baseline.   Psychiatric:         Mood and Affect: Mood normal.         Behavior: Behavior normal.         Thought Content: Thought content normal.              Primary Care Physician   [facility]    Discharge Orders      Follow-Up with Cardiac Advanced Practice Provider      MENTAL HEALTH REFERRAL  - Adult; Assessments and Testing; ADHD; [facility] [phone]; We will contact you to schedule the appointment or please call with any questions      Reason for your hospital stay    Acute heart failure     Activity    Your activity upon discharge: activity as tolerated     Monitor and record    Weigh yourself every morning     When to contact your care team    Contact your care team If symptoms get worse, If increased shortness of breath, If waking up at night with difficulty breathing, If unable to lie down for sleep due to symptoms, If weight gain of 2 pounds a day for 2 days in a row OR 5 pounds in 1 week., Increased swelling in your ankles or legs, and Dizziness or lightheadedness     Discharge Follow Up - with Primary Care clinic within 3-5 days (RN to schedule prior to d/c for HE/[facility] primary care     Add follow up information to the AVS prior to printing     Discharge Instructions    Discontinue your Adderall until cardiology further assess your heart failure and follow-up with mental health     Diet    2 Gram Sodium Diet       Significant Results and Procedures   Most Recent 3 CBC's:  Recent Labs   Lab Test 10/15/21  0435   WBC 8.2   HGB 15.2   MCV 93        Most Recent 3 BMP's:  Recent Labs   Lab Test 10/16/21  0440 10/15/21  1545 10/15/21  0435 10/14/21  0641 10/14/21  0434     --  143  --  142   POTASSIUM 4.6 4.6 2.8*   < > 2.4*   CHLORIDE 100  --  98  --  99   CO2 23  --  34*  --  34*   BUN 12  --  10  --  11   CR 0.95  --  0.77  --  0.70   ANIONGAP 14   --  11  --  9   DELORES 9.8  --  8.7  --  8.5     --  88  --  85    < > = values in this interval not displayed.     Most Recent 2 LFT's:  Recent Labs   Lab Test 10/15/21  0435 10/14/21  0434   * 266*   * 278*   ALKPHOS 97 77   BILITOTAL 0.8 0.9     Most Recent 3 INR's:  Recent Labs   Lab Test 10/12/21  2248   INR 1.29*     Most Recent 3 BNP's:No lab results found.,   Results for orders placed or performed during the hospital encounter of 10/13/21   MRI Cardiac w/contrast and stress    Narrative                                                     [facility]                                                     CMR Report      MRN:                  [id number]                                  Name:          DANIELLA MAGAÑA                                  :                  1980                                  Scan Date:   2021-10-14 11:13:43                                  Electronically signed by Alphonso Amezcua 2021-Oct-14 17:06:51    VITALS   ==========================================================================================================    HEIGHT: 57.01 in    (144.80 cm)  WEIGHT: 120.81 lbs    (54.80 kgs)  BSA: 1.45 m^2    FINAL IMPRESSION   ==========================================================================================================    Non-ischemic cardiomyopathy    SUMMARY   ==========================================================================================================    1.  Pharmacological Regadenoson stress cardiac MRI is negative for inducible myocardial ischemia.   2.  Pharmacological stress ECG is negative for inducible myocardial ischemia.   3.  Normal left ventricular size and volume with severe reduction in systolic function. The quantified left  ventricular ejection fraction is 31.9%.  There is global hypokinesis.  No focal myocardial scar identified  on LGE images.  ECV (Extracellular volume) is abnormal at 36% and pre contrast T1 mapping  (septal) is  abnormal at 1127 ms consistent with diffuse interstitial myocardial fibrosis.  These finds can be seen in  stress induced cardiomyopathy, rheumatoid arthritis, systemic sclerosis and less likely acute myocarditis  given normal LGE.        4.  Normal right ventricular size with moderate reduction in systolic function.    5.  No significant valvular abnormalities.  6.  There is a moderate circumferential pericardial effusion. Pericardium thickness is normal. No  enhancement of pericardium.   ==========================================================================================================  REPORT FINDINGS:    LEFT VENTRICLE: LV wall thickness is normal. LV cavity size is normal. LV systolic function is severely decreased globally.  There is no LV mass/thrombus. LV systolic function is normal. Quantitative LVEF 31.9 %.    VIABILITY: Hyperenhancement is normal.    STRESS: Stress perfusion is normal.    RIGHT VENTRICLE: RV wall thickness is normal. RV cavity size is normal. RV systolic function is moderately decreased  globally. There is no RV mass/thrombus. Quantitative RVEF 38.5 %.    LEFT ATRIUM: LA is mildly enlarged.    RIGHT ATRIUM: RA cavity size is normal.    PERICARDIUM: Pericardium is normal. There is a moderate pericardial effusion. There is a circumferential pericardial  effusion. Pericardial effusion is not loculated. There are no signs of increased intrapericardial  pressures.    PLEURAL EFFUSION: There is no pleural effusion.    AORTIC VALVE: The aortic valve annulus is normal in size. Aortic valve is trileaflet. There is no aortic regurgitation.  There is no aortic stenosis.     MITRAL VALVE: The mitral valve annulus is normal in size. Mitral valve leaflets are normal. There is trivial mitral  regurgitation. There is no mitral stenosis.     TRICUSPID VALVE: The tricuspid valve annulus is normal in size. Tricuspid valve leaflets are normal. There is trivial  tricuspid regurgitation.  There is no tricuspid stenosis.     PULMONIC VALVE: The pulmonic valve annulus is normal in size. Pulmonic valve leaflets are normal. There is no pulmonic  regurgitation. There is no pulmonic stenosis. The pulmonic valve annulus is normal in size.    AORTIC ROOT: The aortic root is normal.      CORE EXAM   ==========================================================================================================    MEASUREMENTS   ----------------------------------------------------------------------------------------      VOLUMETRIC ANALYSIS       ----------------------------------------------  .---------------------------------------------------------.                    LV    Reference  RV     Reference   +------+-----------+------+-----------+-------+-----------+   EDV   ml          118   ()  110.5   ()          ml/m^2     81.3   (62-96)    76.1   (61-98)     ESV   ml         80.4   (27-64)    68.0   (25-77)           ml/m^2     55.4   (17-36)    46.8   (17-43)     CO    L/min                                                 L/min/m^2                                       MASS  g           132   ()                            g/m^2      90.9   (47-77)                       SV    ml         37.6   ()   42.5   ()          ml/m^2     25.9   (40-65)    29.3   (38-62)     EF    %          31.9   (57-75)    38.5   (51-75)    '------+-----------+------+-----------+-------+-----------'        LV DIMENSIONS       ----------------------------------------------          WALL THICKNESS - ANTEROSEPTAL:  0.9 cm          WALL THICKNESS - MAXIMUM:  0.9 cm          LV ROSE:  4.95 cm          LV ESD:  3.9 cm        EXTRACELLULAR VOLUME MEASUREMENT       ----------------------------------------------          PRE-CONTRAST T1 MYOCARDIUM:  1127 msec          PRE-CONTRAST T1 LV CAVITY:  1617 msec          POST-CONTRAST T1  MYOCARDIUM:  381 msec          POST-CONTRAST T1 LV CAVITY:  290 msec          HEMATOCRIT:  40 %          HEMATOCRIT DATE:  2021-10-13 00:00:00          ECV:  37 %      17 SEGMENT   ----------------------------------------------------------------------------------------  .------------------------------------------------------------------------------------------.   Segments            Wall Motion   Hyperenhancement  Stress Perfusion  Interpretation   +--------------------+--------------+------------------+------------------+----------------+   Base Anterior       Severe Hypo   None              Normal                              Base Anteroseptal   Severe Hypo   None              Normal                              Base Inferoseptal   Severe Hypo   None              Normal                              Base Inferior       Severe Hypo   None              Normal                              Base Inferolateral  Severe Hypo   None              Normal                              Base Anterolateral  Severe Hypo   None              Normal                              Mid Anterior        Severe Hypo   None              Normal                              Mid Anteroseptal    Severe Hypo   None              Normal                              Mid Inferoseptal    Severe Hypo   None              Normal                              Mid Inferior        Severe Hypo   None              Normal                              Mid Inferolateral   Severe Hypo   None              Normal                              Mid Anterolateral   Severe Hypo   None              Normal                              Apical Anterior     Severe Hypo   None              Normal                              Apical Septal       Severe Hypo   None              Normal                              Apical Inferior     Severe Hypo   None              Normal                               Apical Lateral      Severe Hypo   None              Normal                              [place]                Severe Hypo   None              Normal                             +--------------------+--------------+------------------+------------------+----------------+   RV Segments         Wall Motion   Hyperenhancement  Stress Perfusion  Interpretation   +--------------------+--------------+------------------+------------------+----------------+   RV Basal Anterior   Normal/Hyper  None                                Normal            RV Basal Inferior   Normal/Hyper  None                                Normal            RV Mid              Normal/Hyper  None                                Normal            RV Apical           Normal/Hyper  None                                Normal           '--------------------+--------------+------------------+------------------+----------------'        FINDINGS       ----------------------------------------------          LV SCAR SIZE (17 SEGMENT):  0 %      STRESS   ==========================================================================================================    STRESS PROTOCOL   ----------------------------------------------------------------------------------------      Regadenoson      AMOUNT:  0.4 mg      MOTION CORRECTED:  Yes      ANALYSIS:  Visual     RESTING DATA   ----------------------------------------------------------------------------------------      SYSTOLIC BP:  108 mmHg      DIASTOLIC BP:  70 mmHg      RESTING HR:  68 BPM      ECG:  NSR, Non-specific ST-T wave abnormalities     STRESS DATA   ----------------------------------------------------------------------------------------      PEAK SYSTOLIC BP:  113 mmHg      PEAK DIASTOLIC BP:  68 mmHg      PEAK HR:  69 BPM      REASON FOR TERMINATION:  Protocol Complete      COMPLICATIONS:  None       POST STRESS ECG:  No change from pre-stress  ECG      SCAN INFO   ==========================================================================================================    GENERAL   ----------------------------------------------------------------------------------------      SCANNER       ----------------------------------------------          :  SIEMENS          MODEL:  Aera          PULSE SEQUENCES:  SSFP cine, GRE cine, 2D LGE segmented, 2D LGE single-shot, Black-blood LGE (or          Grey-blood), Pre-contrast T1 mapping, Post-contrast T1 mapping, First-pass perfusion with stress,          HASTE morphology         CONTRAST AGENT       ----------------------------------------------          TYPE:  Gadavist          GD CONCENTRATION:  0.5 M          VOLUME ADMINISTERED:  16 ml          DOSAGE:  0.15 mmol/kg        SEDATION       ----------------------------------------------          SEDATION USED?:  No        SETUP       ----------------------------------------------          SCAN TYPE:  Clinical          PATIENT TYPE:  Inpatient          INCOMPLETE SCAN:  No          REASON(S) FOR SCAN:  Dyspnea, Ischemic vs nonischemic, nonischemic CM etiology, Myocarditis          (known/suspect)           REFERRING PHYSICIAN:  NANCY BARBER          ATTENDING PHYSICIAN:  ESPERANZA GARCIA      Report generated by [employer], a product of [employer]   US Liver    Narrative    EXAM: ULTRASOUND LIVER LIMITED  LOCATION: [facility]  DATE/TIME: 10/14/2021 1:00 PM    INDICATION: Elevated liver enzymes, evaluate for signs of cirrhosis, inflammation, biliary obstruction.  TECHNIQUE: Limited liver ultrasound for surveillance.  COMPARISON: CT 10/12/2021    FINDINGS: Ultrasound visualization score: No or minimal limitations.    GALLBLADDER: Normal. No gallstones, wall thickening, or pericholecystic fluid.    BILE DUCTS: No biliary dilatation. The common duct measures 2 mm.    LIVER: Normal parenchyma with smooth contour. Echogenic  foci adjacent the falciform ligament correlates with the focal fat infiltration seen on CT. No suspicious mass.    SPLEEN: Normal.    No ascites. There are small bilateral pleural effusions.      Impression    IMPRESSION:  1.  Normal-appearing liver and bile ducts.  2.  Focal fatty infiltration adjacent the falciform ligament.     MR Myocardium  Overread    Narrative                                                     [facility]                                                     CMR Report      MRN:                  [id number]                                  Name:          DANIELLA MAGAÑA                                  :                  1980                                  Scan Date:   2021-10-14 11:13:43                                  Electronically signed by Alphonso Amezcua 2021-Oct-14 17:06:51    VITALS   ==========================================================================================================    HEIGHT: 57.01 in    (144.80 cm)  WEIGHT: 120.81 lbs    (54.80 kgs)  BSA: 1.45 m^2    FINAL IMPRESSION   ==========================================================================================================    Non-ischemic cardiomyopathy    SUMMARY   ==========================================================================================================    1.  Pharmacological Regadenoson stress cardiac MRI is negative for inducible myocardial ischemia.   2.  Pharmacological stress ECG is negative for inducible myocardial ischemia.   3.  Normal left ventricular size and volume with severe reduction in systolic function. The quantified left  ventricular ejection fraction is 31.9%.  There is global hypokinesis.  No focal myocardial scar identified  on LGE images.  ECV (Extracellular volume) is abnormal at 36% and pre contrast T1 mapping (septal) is  abnormal at 1127 ms consistent with diffuse interstitial myocardial fibrosis.  These finds can be seen in  stress induced  cardiomyopathy, rheumatoid arthritis, systemic sclerosis and less likely acute myocarditis  given normal LGE.        4.  Normal right ventricular size with moderate reduction in systolic function.    5.  No significant valvular abnormalities.  6.  There is a moderate circumferential pericardial effusion. Pericardium thickness is normal. No  enhancement of pericardium.   ==========================================================================================================  REPORT FINDINGS:    LEFT VENTRICLE: LV wall thickness is normal. LV cavity size is normal. LV systolic function is severely decreased globally.  There is no LV mass/thrombus. LV systolic function is normal. Quantitative LVEF 31.9 %.    VIABILITY: Hyperenhancement is normal.    STRESS: Stress perfusion is normal.    RIGHT VENTRICLE: RV wall thickness is normal. RV cavity size is normal. RV systolic function is moderately decreased  globally. There is no RV mass/thrombus. Quantitative RVEF 38.5 %.    LEFT ATRIUM: LA is mildly enlarged.    RIGHT ATRIUM: RA cavity size is normal.    PERICARDIUM: Pericardium is normal. There is a moderate pericardial effusion. There is a circumferential pericardial  effusion. Pericardial effusion is not loculated. There are no signs of increased intrapericardial  pressures.    PLEURAL EFFUSION: There is no pleural effusion.    AORTIC VALVE: The aortic valve annulus is normal in size. Aortic valve is trileaflet. There is no aortic regurgitation.  There is no aortic stenosis.     MITRAL VALVE: The mitral valve annulus is normal in size. Mitral valve leaflets are normal. There is trivial mitral  regurgitation. There is no mitral stenosis.     TRICUSPID VALVE: The tricuspid valve annulus is normal in size. Tricuspid valve leaflets are normal. There is trivial  tricuspid regurgitation. There is no tricuspid stenosis.     PULMONIC VALVE: The pulmonic valve annulus is normal in size. Pulmonic valve leaflets are normal.  There is no pulmonic  regurgitation. There is no pulmonic stenosis. The pulmonic valve annulus is normal in size.    AORTIC ROOT: The aortic root is normal.      CORE EXAM   ==========================================================================================================    MEASUREMENTS   ----------------------------------------------------------------------------------------      VOLUMETRIC ANALYSIS       ----------------------------------------------  .---------------------------------------------------------.                    LV    Reference  RV     Reference   +------+-----------+------+-----------+-------+-----------+   EDV   ml          118   ()  110.5   ()          ml/m^2     81.3   (62-96)    76.1   (61-98)     ESV   ml         80.4   (27-64)    68.0   (25-77)           ml/m^2     55.4   (17-36)    46.8   (17-43)     CO    L/min                                                 L/min/m^2                                       MASS  g           132   ()                            g/m^2      90.9   (47-77)                       SV    ml         37.6   ()   42.5   ()          ml/m^2     25.9   (40-65)    29.3   (38-62)     EF    %          31.9   (57-75)    38.5   (51-75)    '------+-----------+------+-----------+-------+-----------'        LV DIMENSIONS       ----------------------------------------------          WALL THICKNESS - ANTEROSEPTAL:  0.9 cm          WALL THICKNESS - MAXIMUM:  0.9 cm          LV ROSE:  4.95 cm          LV ESD:  3.9 cm        EXTRACELLULAR VOLUME MEASUREMENT       ----------------------------------------------          PRE-CONTRAST T1 MYOCARDIUM:  1127 msec          PRE-CONTRAST T1 LV CAVITY:  1617 msec          POST-CONTRAST T1 MYOCARDIUM:  381 msec          POST-CONTRAST T1 LV CAVITY:  290 msec          HEMATOCRIT:  40 %          HEMATOCRIT DATE:  2021-10-13  00:00:00          ECV:  37 %      17 SEGMENT   ----------------------------------------------------------------------------------------  .------------------------------------------------------------------------------------------.   Segments            Wall Motion   Hyperenhancement  Stress Perfusion  Interpretation   +--------------------+--------------+------------------+------------------+----------------+   Base Anterior       Severe Hypo   None              Normal                              Base Anteroseptal   Severe Hypo   None              Normal                              Base Inferoseptal   Severe Hypo   None              Normal                              Base Inferior       Severe Hypo   None              Normal                              Base Inferolateral  Severe Hypo   None              Normal                              Base Anterolateral  Severe Hypo   None              Normal                              Mid Anterior        Severe Hypo   None              Normal                              Mid Anteroseptal    Severe Hypo   None              Normal                              Mid Inferoseptal    Severe Hypo   None              Normal                              Mid Inferior        Severe Hypo   None              Normal                              Mid Inferolateral   Severe Hypo   None              Normal                              Mid Anterolateral   Severe Hypo   None              Normal                              Apical Anterior     Severe Hypo   None              Normal                              Apical Septal       Severe Hypo   None              Normal                              Apical Inferior     Severe Hypo   None              Normal                              Apical Lateral      Severe Hypo   None              Normal                              Earp                 Severe Hypo   None              Normal                             +--------------------+--------------+------------------+------------------+----------------+   RV Segments         Wall Motion   Hyperenhancement  Stress Perfusion  Interpretation   +--------------------+--------------+------------------+------------------+----------------+   RV Basal Anterior   Normal/Hyper  None                                Normal            RV Basal Inferior   Normal/Hyper  None                                Normal            RV Mid              Normal/Hyper  None                                Normal            RV Apical           Normal/Hyper  None                                Normal           '--------------------+--------------+------------------+------------------+----------------'        FINDINGS       ----------------------------------------------          LV SCAR SIZE (17 SEGMENT):  0 %      STRESS   ==========================================================================================================    STRESS PROTOCOL   ----------------------------------------------------------------------------------------      Regadenoson      AMOUNT:  0.4 mg      MOTION CORRECTED:  Yes      ANALYSIS:  Visual     RESTING DATA   ----------------------------------------------------------------------------------------      SYSTOLIC BP:  108 mmHg      DIASTOLIC BP:  70 mmHg      RESTING HR:  68 BPM      ECG:  NSR, Non-specific ST-T wave abnormalities     STRESS DATA   ----------------------------------------------------------------------------------------      PEAK SYSTOLIC BP:  113 mmHg      PEAK DIASTOLIC BP:  68 mmHg      PEAK HR:  69 BPM      REASON FOR TERMINATION:  Protocol Complete      COMPLICATIONS:  None       POST STRESS ECG:  No change from pre-stress ECG      SCAN INFO    ==========================================================================================================    GENERAL   ----------------------------------------------------------------------------------------      SCANNER       ----------------------------------------------          :  SIEMENS          MODEL:  Aera          PULSE SEQUENCES:  SSFP cine, GRE cine, 2D LGE segmented, 2D LGE single-shot, Black-blood LGE (or          Grey-blood), Pre-contrast T1 mapping, Post-contrast T1 mapping, First-pass perfusion with stress,          HASTE morphology         CONTRAST AGENT       ----------------------------------------------          TYPE:  Gadavist          GD CONCENTRATION:  0.5 M          VOLUME ADMINISTERED:  16 ml          DOSAGE:  0.15 mmol/kg        SEDATION       ----------------------------------------------          SEDATION USED?:  No        SETUP       ----------------------------------------------          SCAN TYPE:  Clinical          PATIENT TYPE:  Inpatient          INCOMPLETE SCAN:  No          REASON(S) FOR SCAN:  Dyspnea, Ischemic vs nonischemic, nonischemic CM etiology, Myocarditis          (known/suspect)           REFERRING PHYSICIAN:  NANCY BARBER          ATTENDING PHYSICIAN:  ESPERANZA GARCIA      Report generated by [employer], a product of [employer]       Discharge Medications   Current Discharge Medication List      START taking these medications    Details   bumetanide (BUMEX) 2 MG tablet Take 2 tablets (4 mg) by mouth daily  Qty: 60 tablet, Refills: 0    Associated Diagnoses: Acute systolic congestive heart failure (H)      lisinopril (ZESTRIL) 10 MG tablet Take 1 tablet (10 mg) by mouth daily  Qty: 30 tablet, Refills: 0    Associated Diagnoses: Acute systolic congestive heart failure (H)      metoprolol succinate ER (TOPROL-XL) 25 MG 24 hr tablet Take 1 tablet (25 mg) by mouth daily  Qty: 30 tablet, Refills: 0    Associated Diagnoses: Acute systolic congestive  heart failure (H)      spironolactone (ALDACTONE) 25 MG tablet Take 1 tablet (25 mg) by mouth daily  Qty: 30 tablet, Refills: 0    Associated Diagnoses: Acute systolic congestive heart failure (H)         CONTINUE these medications which have NOT CHANGED    Details   albuterol (PROAIR HFA/PROVENTIL HFA/VENTOLIN HFA) 108 (90 Base) MCG/ACT inhaler Inhale 1-2 puffs into the lungs every 4 hours as needed for wheezing      FLOVENT  MCG/ACT inhaler Inhale 2 puffs into the lungs 2 times daily      montelukast (SINGULAIR) 10 MG tablet Take 10 mg by mouth daily      nortriptyline (PAMELOR) 50 MG capsule Take 60 mg by mouth daily       sertraline (ZOLOFT) 50 MG tablet Take 50 mg by mouth daily      triamcinolone (KENALOG) 0.1 % external cream Apply 1 Application topically 2 times daily as needed (excema)          STOP taking these medications       amphetamine-dextroamphetamine (ADDERALL XR) 20 MG 24 hr capsule Comments:   Reason for Stopping:         ibuprofen (ADVIL/MOTRIN) 400 MG tablet Comments:   Reason for Stopping:             Allergies   No Known Allergies

## 2021-10-16 NOTE — PROGRESS NOTES
Care Management Discharge Note    Discharge Date: 10/16/2021       Discharge Disposition: Home    Discharge Services: None    Discharge DME: None    Discharge Transportation:  Friend    Private pay costs discussed: Not applicable    PAS Confirmation Code:  NA  Patient/family educated on Medicare website which has current facility and service quality ratings:  NA    Education Provided on the Discharge Plan:  Yes  Persons Notified of Discharge Plans: Patient  Patient/Family in Agreement with the Plan: yes    Handoff Referral Completed: NA    Additional Information:    Pt discharging home, no CM needs identified.  Friend will be transporting.     AUGUSTINE Shanks

## 2021-10-16 NOTE — PROGRESS NOTES
HEART CARE NOTE          Assessment/Recommendations     1. HFrEF c/b ADHF  Assessment / Plan    Near euvolemia on physical exam --> toleerating oral diuretic therapy    S/p stress MRI with viability which was negative for ischemia, not likely to be myocarditis. Findings were most consistent with stress induced cardiomyopathy, rheumatoid arthritis, systemic sclerosis; EF showed significant improvement to ~ 30% in keeping with a stress induced CM; will repeat echo; will alternative to ongoing Adderall/amphetamine for her ADD as she will need to hold Adderall    GDMT as detailed below      Current Pharmacotherapy AHA Guideline-Directed Medical Therapy   Lisinopril 10 mg daily Lisinopril 20 mg twice daily   Metoprolol succinate 25 mg daily Carvedilol 25 mg twice daily   Spironolactone 25 mg Spironolactone 25 mg once daily   Hydralazine NA Hydralazine 100 mg three times daily   Isosorbide dinitrate NA Isosorbide dinitrate 40 mg three times daily   SGLT2 inhibitor: Not started Dapagliflozin or Empagliflozin 10 mg daily      2. ADD  Assessment / Plan    Adderall on hold given above - patient reports longterm use/since childhood; will need to transition to alternatives given new CM     3. EtOH abuse  Assessment / Plan    Counseled re:abstinace       4. HTN  Assessment / Plan    Titrate oral afterload reduction as tolerated     Ok for discharge from a cardiology standpoint. Cardiology team will sign-off for now. Please do not hesitate toconsult us again if new questions or concerns arise. Follow-up appointment will be arranged by [facility] clinic.     History of Present Illness/Subjective      Ms. Tereza Suarez is a 41 year old female with a PMHx significant for history of hypertension, attention deficit disorder since childhood on chronic Adderall therapy, e-cigarette use, binge alcohol consumption, history of preeclampsia, first presented to urgency room with complaint of acute on chronic dyspnea and progressive  fatigue.  D-dimer was found to be elevated and work-up included pulmonary CTA which was negative for pulmonary embolism but showed pulmonary edema.  She was then referred to [facility] ED where she has been boarding until her transfer to [facility].     Today, Mrs. Reports resolution of presenting symptoms. Denies HF symptoms of orthopnea, PND or fluid retention/edema     ECG: Personally reviewed. there are no previous tracings available for comparison, nonspecific ST and T waves changes, sinus tachycardia.     Stress MRI:  1.  Pharmacological Regadenoson stress cardiac MRI is negative for inducible myocardial ischemia.   2.  Pharmacological stress ECG is negative for inducible myocardial ischemia.   3.  Normal left ventricular size and volume with severe reduction in systolic function. The quantified left  ventricular ejection fraction is 31.9%.  There is global hypokinesis.  No focal myocardial scar identified  on LGE images.  ECV (Extracellular volume) is abnormal at 36% and pre contrast T1 mapping (septal) is  abnormal at 1127 ms consistent with diffuse interstitial myocardial fibrosis.  These finds can be seen in  stress induced cardiomyopathy, rheumatoid arthritis, systemic sclerosis and less likely acute myocarditis  given normal LGE.        4.  Normal right ventricular size with moderate reduction in systolic function.    5.  No significant valvular abnormalities.  6.  There is a moderate circumferential pericardial effusion. Pericardium thickness is normal. No  enhancement of pericardium.      ECHO (personnaly Reviewed):     1. The left ventricle is normal in size. Left ventricular function is  decreased. The ejection fraction is 10-15% (severely reduced).There is severe  global hypokinesia of the left ventricle.  2. The right ventricle is normal size. Moderately decreased right ventricular  systolic function  3. The left atrium is mildly dilated. The right atrium is mildly dilated.  4. There is mild (1+)  mitral regurgitation.  5. There is moderate (2+) tricuspid regurgitation. RA pressure of 8 mmHg. The  right ventricular systolic pressure is approximated at 25mmHg plus the right  atrial pressure.  6. Small pericardial effusion. There are no echocardiographic indications of  cardiac tamponade.          Physical Examination Review of Systems   /78   Pulse 85   Temp 98.1  F (36.7  C) (Oral)   Resp 20   Wt 50.9 kg (112 lb 4.8 oz)   LMP 09/28/2021   SpO2 98%   BMI 24.30 kg/m    Body mass index is 24.3 kg/m .  Wt Readings from Last 3 Encounters:   10/15/21 50.9 kg (112 lb 4.8 oz)   10/13/21 59 kg (130 lb 1.1 oz)     General Appearance:   no distress, normal body habitus   ENT/Mouth: membranes moist, no oral lesions or bleeding gums.      EYES:  no scleral icterus, normal conjunctivae   Neck: no carotid bruits or thyromegaly   Chest/Lungs:   lungs are clear to auscultation, no rales or wheezing, equal chest wall expansion    Cardiovascular:   Regular. Normal first and second heart sounds with no murmurs, rubs, or gallops; the carotid, radial and posterior tibial pulses are intact, no JVD or LE edema bilaterally    Abdomen:  no organomegaly, masses, bruits, or tenderness; bowel sounds are present   Extremities: no cyanosis or clubbing   Skin: no xanthelasma, warm.    Neurologic: normal gait, normal  bilateral, no tremors     Psychiatric: alert and oriented x3, calm     A complete 10 systems ROS was reviewed  And is negative except what is listed in the HPI.          Medical History  Surgical History Family History Social History   Past Medical History:   Diagnosis Date     Alcohol consumption binge drinking 10/13/2021     Electronic cigarette use 10/13/2021    No past surgical history on file. no family history of premature coronary artery disease Social History     Socioeconomic History     Marital status: Single     Spouse name: Not on file     Number of children: Not on file     Years of education: Not  on file     Highest education level: Not on file   Occupational History     Not on file   Tobacco Use     Smoking status: Never Smoker   Substance and Sexual Activity     Alcohol use: Not on file     Drug use: Not on file     Sexual activity: Not on file   Other Topics Concern     Not on file   Social History Narrative     Not on file     Social Determinants of Health     Financial Resource Strain:      Difficulty of Paying Living Expenses:    Food Insecurity:      Worried About Running Out of Food in the Last Year:      Ran Out of Food in the Last Year:    Transportation Needs:      Lack of Transportation (Medical):      Lack of Transportation (Non-Medical):    Physical Activity:      Days of Exercise per Week:      Minutes of Exercise per Session:    Stress:      Feeling of Stress :    Social Connections:      Frequency of Communication with Friends and Family:      Frequency of Social Gatherings with Friends and Family:      Attends [employer] Services:      Active Member of Clubs or Organizations:      Attends Club or Organization Meetings:      Marital Status:    Intimate Partner Violence:      Fear of Current or Ex-Partner:      Emotionally Abused:      Physically Abused:      Sexually Abused:            Lab Results    Chemistry/lipid CBC Cardiac Enzymes/BNP/TSH/INR   Lab Results   Component Value Date    BUN 10 10/15/2021     10/15/2021    CO2 34 (H) 10/15/2021    Lab Results   Component Value Date    WBC 8.2 10/15/2021    HGB 15.2 10/15/2021    HCT 45.7 10/15/2021    MCV 93 10/15/2021     10/15/2021    Lab Results   Component Value Date    TROPONINI 0.04 10/12/2021    BNP 1,421 (H) 10/12/2021    TSH 1.21 10/12/2021    INR 1.29 (H) 10/12/2021     Lab Results   Component Value Date    TROPONINI 0.04 10/12/2021          Weight:    Wt Readings from Last 3 Encounters:   10/15/21 50.9 kg (112 lb 4.8 oz)   10/13/21 59 kg (130 lb 1.1 oz)       Allergies  No Known Allergies      Surgical History  No past  surgical history on file.    Social History  Tobacco:   History   Smoking Status     Never Smoker   Smokeless Tobacco     Not on file    Alcohol:   Social History    Substance and Sexual Activity      Alcohol use: Not on file   Illicit Drugs:   History   Drug Use Not on file       Family History  No family history on file.       Alexander Rhodes MD on 10/16/2021      cc: Clinic, [facility]

## 2021-10-17 ENCOUNTER — NURSE TRIAGE (OUTPATIENT)
Dept: NURSING | Facility: CLINIC | Age: 41
End: 2021-10-17

## 2021-10-17 NOTE — TELEPHONE ENCOUNTER
"Pt is calling.    Recently hospitalized at [facility].  On Spironolactone 25 mg daily.  Sharp pains in her stomach since she started this medication. She is wondering if there is anything else that she can take in place of it.  PCP is with [facility] in [place].  Denies nausea, vomiting, diarrhea.  Lower abdominal cramping that comes and goes.    Care advice reviewed .  I advised her that that can be a side effect of the medication.  I encouraged her to contact her PCP in the morning, as they will be the ones to tell her if they want her to go off of it, or put her on something else. Very important to follow up with them post hospital stays.   She verbalized understanding and will call them in the AM.      Reason for Disposition    [1] MILD pain (e.g., does not interfere with normal activities) AND [2] pain comes and goes (cramps) AND [3] present > 48 hours    Additional Information    Negative: Shock suspected (e.g., cold/pale/clammy skin, too weak to stand, low BP, rapid pulse)    Negative: Difficult to awaken or acting confused (e.g., disoriented, slurred speech)    Negative: Passed out (i.e., lost consciousness, collapsed and was not responding)    Negative: Sounds like a life-threatening emergency to the triager    Negative: Chest pain    Negative: Pain is mainly in upper abdomen  (if needed ask: \"is it mainly above the belly button?\")    Negative: Followed an abdomen (stomach) injury    Negative: [1] Abdominal pain AND [2] pregnant < 20 weeks    Negative: [1] Abdominal pain AND [2] pregnant > 20 weeks    Negative: [1] Abdominal pain AND [2] postpartum (from 0 to 6 weeks after delivery)    Negative: [1] SEVERE pain (e.g., excruciating) AND [2] present > 1 hour    Negative: [1] SEVERE pain AND [2] age > 60    Negative: [1] Vomiting AND [2] contains red blood or black (\"coffee ground\") material  (Exception: few red streaks in vomit that only happened once)    Negative: Blood in bowel movements   (Exception: " blood on surface of BM with constipation)    Negative: Black or tarry bowel movements  (Exception: chronic-unchanged  black-grey bowel movements AND is taking iron pills or Pepto-bismol)    Negative: Patient sounds very sick or weak to the triager    Negative: [1] MILD-MODERATE pain AND [2] constant AND [3] present > 2 hours    Negative: [1] Vomiting AND [2] abdomen looks much more swollen than usual    Negative: [1] Vomiting AND [2] contains bile (green color)    Negative: White of the eyes have turned yellow (i.e., jaundice)    Negative: Fever > 103 F (39.4 C)    Negative: [1] Fever > 101 F (38.3 C) AND [2] age > 60    Negative: [1] Fever > 100.0 F (37.8 C) AND [2] bedridden (e.g., nursing home patient, CVA, chronic illness, recovering from surgery)    Negative: [1] Fever > 100.0 F (37.8 C) AND [2] diabetes mellitus or weak immune system (e.g., HIV positive, cancer chemo, splenectomy, organ transplant, chronic steroids)    Negative: [1] SEVERE pain AND [2] present < 1 hour    Negative: [1] MODERATE pain (e.g., interferes with normal activities) AND [2] pain comes and goes (cramps) AND [3] present > 24 hours  (Exception: pain with Vomiting or Diarrhea - see that Guideline)    Protocols used: ABDOMINAL PAIN - FEMALE-A-AH    Gwendolyn Barfield RN  [facility] Nurse Advisor  10/17/2021 at 6:35 PM

## 2021-10-18 ENCOUNTER — PATIENT OUTREACH (OUTPATIENT)
Dept: CARE COORDINATION | Facility: CLINIC | Age: 41
End: 2021-10-18

## 2021-10-18 DIAGNOSIS — Z71.89 OTHER SPECIFIED COUNSELING: ICD-10-CM

## 2021-10-18 NOTE — PROGRESS NOTES
Clinic Care Coordination Contact  [facility]/Voicemail       Clinical Data: Care Coordinator Outreach  Outreach attempted x 1.  Left message on patient's voicemail with call back information and requested return call.  Plan: Care Coordinator will try to reach patient again in 1-2 business days.      CAMI Henderson  [phone]  [facility]

## 2021-10-19 NOTE — PROGRESS NOTES
Clinic Care Coordination Contact  [facility]/Voicemail       Clinical Data: Care Coordinator Outreach  Outreach attempted x 2.  Unable to leave message on patient's voicemail with call back information.    Plan: Care Coordinator will do no further outreaches at this time.    MAYCO Jefferson  [phone]  [facility]

## 2022-02-05 ENCOUNTER — HEALTH MAINTENANCE LETTER (OUTPATIENT)
Age: 42
End: 2022-02-05

## 2022-10-01 ENCOUNTER — HEALTH MAINTENANCE LETTER (OUTPATIENT)
Age: 42
End: 2022-10-01

## 2023-05-13 ENCOUNTER — HEALTH MAINTENANCE LETTER (OUTPATIENT)
Age: 43
End: 2023-05-13

## 2024-03-03 ENCOUNTER — HEALTH MAINTENANCE LETTER (OUTPATIENT)
Age: 44
End: 2024-03-03

## 2024-07-20 ENCOUNTER — HEALTH MAINTENANCE LETTER (OUTPATIENT)
Age: 44
End: 2024-07-20